# Patient Record
Sex: FEMALE | Race: AMERICAN INDIAN OR ALASKA NATIVE | HISPANIC OR LATINO
[De-identification: names, ages, dates, MRNs, and addresses within clinical notes are randomized per-mention and may not be internally consistent; named-entity substitution may affect disease eponyms.]

---

## 2017-03-06 ENCOUNTER — APPOINTMENT (OUTPATIENT)
Dept: PULMONOLOGY | Facility: CLINIC | Age: 52
End: 2017-03-06

## 2017-03-06 VITALS
SYSTOLIC BLOOD PRESSURE: 90 MMHG | HEART RATE: 73 BPM | BODY MASS INDEX: 21.71 KG/M2 | WEIGHT: 118 LBS | TEMPERATURE: 98.5 F | DIASTOLIC BLOOD PRESSURE: 60 MMHG | HEIGHT: 62 IN | OXYGEN SATURATION: 98 %

## 2017-08-12 ENCOUNTER — EMERGENCY (EMERGENCY)
Facility: HOSPITAL | Age: 52
LOS: 1 days | Discharge: HOME | End: 2017-08-12
Admitting: EMERGENCY MEDICINE
Payer: COMMERCIAL

## 2017-08-12 VITALS
RESPIRATION RATE: 18 BRPM | TEMPERATURE: 99 F | HEIGHT: 61 IN | SYSTOLIC BLOOD PRESSURE: 100 MMHG | WEIGHT: 116.18 LBS | DIASTOLIC BLOOD PRESSURE: 61 MMHG | HEART RATE: 66 BPM | OXYGEN SATURATION: 96 %

## 2017-08-12 VITALS
DIASTOLIC BLOOD PRESSURE: 64 MMHG | OXYGEN SATURATION: 98 % | SYSTOLIC BLOOD PRESSURE: 113 MMHG | HEART RATE: 57 BPM | TEMPERATURE: 99 F | RESPIRATION RATE: 18 BRPM

## 2017-08-12 DIAGNOSIS — Z87.891 PERSONAL HISTORY OF NICOTINE DEPENDENCE: ICD-10-CM

## 2017-08-12 DIAGNOSIS — H92.01 OTALGIA, RIGHT EAR: ICD-10-CM

## 2017-08-12 DIAGNOSIS — H65.191 OTHER ACUTE NONSUPPURATIVE OTITIS MEDIA, RIGHT EAR: ICD-10-CM

## 2017-08-12 PROCEDURE — 99283 EMERGENCY DEPT VISIT LOW MDM: CPT | Mod: 25

## 2017-08-12 PROCEDURE — 99283 EMERGENCY DEPT VISIT LOW MDM: CPT

## 2017-08-12 RX ORDER — MOMETASONE FUROATE 50 UG/1
2 SPRAY NASAL
Qty: 1 | Refills: 0 | OUTPATIENT
Start: 2017-08-12 | End: 2017-08-19

## 2017-08-12 RX ORDER — PSEUDOEPHEDRINE HCL 30 MG
1 TABLET ORAL
Qty: 12 | Refills: 0 | OUTPATIENT
Start: 2017-08-12 | End: 2017-08-16

## 2017-08-12 RX ORDER — IBUPROFEN 200 MG
1 TABLET ORAL
Qty: 9 | Refills: 0 | OUTPATIENT
Start: 2017-08-12 | End: 2017-08-15

## 2017-08-12 NOTE — ED PROVIDER NOTE - MEDICAL DECISION MAKING DETAILS
pt w/ear pain since yest, no AOM or AOE b/l, + fluid behind b/l TMs, will tx w/nasal spray and sudafed, f/u w/ent. pt understands and agrees w/plan

## 2017-08-12 NOTE — ED PROVIDER NOTE - OBJECTIVE STATEMENT
The pt is a 51 y/o F, who presents to ED c/o R ear pain x 1 d. Pt admits to nasal congestion. Pain is 4/10, pressure like, took tyl w/relief, feels dizzy when turning head. Denies h/a, fevers, chills, discharge from ear, n/v

## 2017-08-12 NOTE — ED ADULT NURSE NOTE - CHPI ED SYMPTOMS NEG
no vomiting/no syncope/no numbness/no nausea/no loss of consciousness/no chills/no weakness no weakness/no syncope/no loss of consciousness/no vomiting/no numbness/no chills

## 2017-08-12 NOTE — ED PROVIDER NOTE - ENMT, MLM
Airway patent, + nasal congestion, no frontal or maxillary sinus tend b/l. + post nasal drip  Mouth with normal mucosa. Throat has no vesicles, no oropharyngeal exudates and uvula is midline. Ears: + fluid behind b/l TMs b/l, no AOM b/l, no AOE b/l. no cervical lymphadenopathy b/l

## 2017-08-12 NOTE — ED ADULT NURSE NOTE - OBJECTIVE STATEMENT
Pt presented to ED with right ear pain, nausea, white color productive cough and dizziness. As per pt, onset of symptom was yesterday. Pt has taken Tylenol 1AM, with moderate relief. Pt denies vomiting, any ear discharge, vision change, SOB, chest pain. Respiration unlabored and even, lungs sound clear bilaterally, skin warm ands non-diaphoretic, pt is able to speak coherently in full sentences, NAD noted.

## 2018-02-17 ENCOUNTER — EMERGENCY (EMERGENCY)
Facility: HOSPITAL | Age: 53
LOS: 1 days | Discharge: HOME | End: 2018-02-17
Admitting: EMERGENCY MEDICINE
Payer: COMMERCIAL

## 2018-02-17 VITALS
RESPIRATION RATE: 16 BRPM | SYSTOLIC BLOOD PRESSURE: 107 MMHG | OXYGEN SATURATION: 98 % | TEMPERATURE: 98 F | WEIGHT: 117.95 LBS | DIASTOLIC BLOOD PRESSURE: 78 MMHG | HEART RATE: 68 BPM | HEIGHT: 62 IN

## 2018-02-17 DIAGNOSIS — Z79.899 OTHER LONG TERM (CURRENT) DRUG THERAPY: ICD-10-CM

## 2018-02-17 DIAGNOSIS — R68.84 JAW PAIN: ICD-10-CM

## 2018-02-17 DIAGNOSIS — Z79.52 LONG TERM (CURRENT) USE OF SYSTEMIC STEROIDS: ICD-10-CM

## 2018-02-17 DIAGNOSIS — M25.521 PAIN IN RIGHT ELBOW: ICD-10-CM

## 2018-02-17 DIAGNOSIS — Z79.1 LONG TERM (CURRENT) USE OF NON-STEROIDAL ANTI-INFLAMMATORIES (NSAID): ICD-10-CM

## 2018-02-17 PROCEDURE — 99283 EMERGENCY DEPT VISIT LOW MDM: CPT

## 2018-02-17 PROCEDURE — 99284 EMERGENCY DEPT VISIT MOD MDM: CPT | Mod: 25

## 2018-02-17 NOTE — ED PROVIDER NOTE - ENMT, MLM
Airway patent, Nasal mucosa clear. Mouth with normal mucosa. + discomfort over R TMJ, no trismus. Throat has no vesicles, no oropharyngeal exudates and uvula is midline. Ears: pearly gray TMs b/l. no cervical lymphadenopathy b/l

## 2018-02-17 NOTE — ED ADULT TRIAGE NOTE - CHIEF COMPLAINT QUOTE
pt c/o right sided jaw pain x 2 months worsening over the last week. pt on Augmentin with no relief.

## 2018-02-17 NOTE — ED PROVIDER NOTE - OBJECTIVE STATEMENT
The pt is a 53 y/o F, who presents to ED c/o R jaw pain x 2 mon. Pt states that has seen her dentist and dx'd w/TMJ, was advised to take ibuprofen, but has only taken it a few times - does admit to pain relief when takes it, no acute changes in symptoms today - pt wants to know why pain remains, reports that got better last wk but then she ate something crunchy and pain returned, 6/10, throbbing, aggravated w/opening mouth wide and chewing. Denies trauma, fevers, chills, pus, bleeding, swelling, h/a

## 2018-02-17 NOTE — ED ADULT NURSE NOTE - CHPI ED SYMPTOMS NEG
no fever/no numbness/no syncope/no loss of consciousness/no nausea/no blurred vision/no vomiting/no weakness/no change in level of consciousness/no chills

## 2018-08-17 ENCOUNTER — APPOINTMENT (OUTPATIENT)
Dept: PULMONOLOGY | Facility: CLINIC | Age: 53
End: 2018-08-17
Payer: MEDICAID

## 2018-08-17 PROCEDURE — 71046 X-RAY EXAM CHEST 2 VIEWS: CPT

## 2018-11-10 VITALS
HEART RATE: 100 BPM | RESPIRATION RATE: 18 BRPM | TEMPERATURE: 98 F | OXYGEN SATURATION: 100 % | SYSTOLIC BLOOD PRESSURE: 126 MMHG | DIASTOLIC BLOOD PRESSURE: 83 MMHG

## 2018-11-10 LAB
ALBUMIN SERPL ELPH-MCNC: 4.9 G/DL — SIGNIFICANT CHANGE UP (ref 3.3–5)
ALP SERPL-CCNC: 95 U/L — SIGNIFICANT CHANGE UP (ref 40–120)
ALT FLD-CCNC: 19 U/L — SIGNIFICANT CHANGE UP (ref 10–45)
ANION GAP SERPL CALC-SCNC: 21 MMOL/L — HIGH (ref 5–17)
APTT BLD: 37 SEC — HIGH (ref 27.5–36.3)
AST SERPL-CCNC: 31 U/L — SIGNIFICANT CHANGE UP (ref 10–40)
BASOPHILS NFR BLD AUTO: 0.4 % — SIGNIFICANT CHANGE UP (ref 0–2)
BILIRUB SERPL-MCNC: 1 MG/DL — SIGNIFICANT CHANGE UP (ref 0.2–1.2)
BUN SERPL-MCNC: 7 MG/DL — SIGNIFICANT CHANGE UP (ref 7–23)
CALCIUM SERPL-MCNC: 10 MG/DL — SIGNIFICANT CHANGE UP (ref 8.4–10.5)
CHLORIDE SERPL-SCNC: 99 MMOL/L — SIGNIFICANT CHANGE UP (ref 96–108)
CO2 SERPL-SCNC: 20 MMOL/L — LOW (ref 22–31)
CREAT SERPL-MCNC: 0.53 MG/DL — SIGNIFICANT CHANGE UP (ref 0.5–1.3)
EOSINOPHIL NFR BLD AUTO: 1.2 % — SIGNIFICANT CHANGE UP (ref 0–6)
GLUCOSE SERPL-MCNC: 114 MG/DL — HIGH (ref 70–99)
HCT VFR BLD CALC: 36.3 % — SIGNIFICANT CHANGE UP (ref 34.5–45)
HGB BLD-MCNC: 12.3 G/DL — SIGNIFICANT CHANGE UP (ref 11.5–15.5)
INR BLD: 1.01 — SIGNIFICANT CHANGE UP (ref 0.88–1.16)
LYMPHOCYTES # BLD AUTO: 31.8 % — SIGNIFICANT CHANGE UP (ref 13–44)
MCHC RBC-ENTMCNC: 23.2 PG — LOW (ref 27–34)
MCHC RBC-ENTMCNC: 33.9 G/DL — SIGNIFICANT CHANGE UP (ref 32–36)
MCV RBC AUTO: 68.4 FL — LOW (ref 80–100)
MONOCYTES NFR BLD AUTO: 4.9 % — SIGNIFICANT CHANGE UP (ref 2–14)
NEUTROPHILS NFR BLD AUTO: 61.7 % — SIGNIFICANT CHANGE UP (ref 43–77)
NT-PROBNP SERPL-SCNC: 42 PG/ML — SIGNIFICANT CHANGE UP (ref 0–300)
PLATELET # BLD AUTO: 335 K/UL — SIGNIFICANT CHANGE UP (ref 150–400)
POTASSIUM SERPL-MCNC: 3.9 MMOL/L — SIGNIFICANT CHANGE UP (ref 3.5–5.3)
POTASSIUM SERPL-SCNC: 3.9 MMOL/L — SIGNIFICANT CHANGE UP (ref 3.5–5.3)
PROT SERPL-MCNC: 8.5 G/DL — HIGH (ref 6–8.3)
PROTHROM AB SERPL-ACNC: 11.4 SEC — SIGNIFICANT CHANGE UP (ref 10–12.9)
RBC # BLD: 5.31 M/UL — HIGH (ref 3.8–5.2)
RBC # FLD: 15.2 % — SIGNIFICANT CHANGE UP (ref 10.3–16.9)
SODIUM SERPL-SCNC: 140 MMOL/L — SIGNIFICANT CHANGE UP (ref 135–145)
TROPONIN T SERPL-MCNC: <0.01 NG/ML — SIGNIFICANT CHANGE UP (ref 0–0.01)
WBC # BLD: 5.1 K/UL — SIGNIFICANT CHANGE UP (ref 3.8–10.5)
WBC # FLD AUTO: 5.1 K/UL — SIGNIFICANT CHANGE UP (ref 3.8–10.5)

## 2018-11-10 PROCEDURE — 71045 X-RAY EXAM CHEST 1 VIEW: CPT | Mod: 26

## 2018-11-10 RX ORDER — SODIUM CHLORIDE 9 MG/ML
1000 INJECTION INTRAMUSCULAR; INTRAVENOUS; SUBCUTANEOUS ONCE
Qty: 0 | Refills: 0 | Status: COMPLETED | OUTPATIENT
Start: 2018-11-10 | End: 2018-11-10

## 2018-11-10 RX ADMIN — SODIUM CHLORIDE 1000 MILLILITER(S): 9 INJECTION INTRAMUSCULAR; INTRAVENOUS; SUBCUTANEOUS at 23:19

## 2018-11-10 NOTE — ED ADULT NURSE NOTE - NSIMPLEMENTINTERV_GEN_ALL_ED
Implemented All Universal Safety Interventions:  [place] to call system. Call bell, personal items and telephone within reach. Instruct patient to call for assistance. Room bathroom lighting operational. Non-slip footwear when patient is off stretcher. Physically safe environment: no spills, clutter or unnecessary equipment. Stretcher in lowest position, wheels locked, appropriate side rails in place.

## 2018-11-10 NOTE — ED PROVIDER NOTE - OBJECTIVE STATEMENT
53 year old female presents to ED with concern for difficulty swallowing after eating a piece of bread at dinner this evening.  Patient states she eventually was able to get the food down, however states she had some residual chest wall discomfort following this incident.  She has been experiencing similar symptoms over the past week and was seen by her PCP who prescribed her "some type of medicine to take before meals, I'm not sure what the name is" - which she has been taking as prescribed.  She was at an event this evening after dinner when she became uneasy feeling and began experiencing extremity weakness and lightheadedness.  On ED arrival, patient notes her symptoms are improving.  She denies associated chest discomfort currently, shortness of breath, drooling, throat pain, fever, chills, abdominal pain, nausea, vomiting or any additional acute complaints or concerns at this time.

## 2018-11-10 NOTE — ED PROVIDER NOTE - ENMT NEGATIVE STATEMENT, MLM
Ears: no ear pain and no hearing problems.Nose: no nasal congestion and no nasal drainage.Mouth/Throat: no dysphagia, no hoarseness + difficulty swallowing. Neck: no lumps, no pain, no stiffness and no swollen glands.

## 2018-11-10 NOTE — ED PROVIDER NOTE - NEUROLOGICAL, MLM
Alert and oriented, no focal deficits, no motor or sensory deficits.  5/5 strength x 4 extremities against gravity and external force.  No slurred speech.  No cerebellar signs.

## 2018-11-10 NOTE — ED ADULT NURSE NOTE - OBJECTIVE STATEMENT
Pt states " I feel weak in my arms and legs, my throat feels dry and I feel like my HR is slow. My doctor said I have silent reflux so I have been eating just vegetables at home so as to decrease the irritation. He also gave me medicine for it which I have been taking." Denies CP, SOB

## 2018-11-10 NOTE — ED ADULT TRIAGE NOTE - ARRIVAL INFO ADDITIONAL COMMENTS
pt c/o difficulty swallowing with throat pain for last 90 mins with sob, weakness in arms and legs, chest pain.  similar episode 1 week ago that lasted 20 mins.

## 2018-11-11 ENCOUNTER — INPATIENT (INPATIENT)
Facility: HOSPITAL | Age: 53
LOS: 1 days | Discharge: HOME | DRG: 392 | End: 2018-11-13
Attending: STUDENT IN AN ORGANIZED HEALTH CARE EDUCATION/TRAINING PROGRAM | Admitting: STUDENT IN AN ORGANIZED HEALTH CARE EDUCATION/TRAINING PROGRAM
Payer: COMMERCIAL

## 2018-11-11 DIAGNOSIS — Z29.9 ENCOUNTER FOR PROPHYLACTIC MEASURES, UNSPECIFIED: ICD-10-CM

## 2018-11-11 DIAGNOSIS — Z91.89 OTHER SPECIFIED PERSONAL RISK FACTORS, NOT ELSEWHERE CLASSIFIED: ICD-10-CM

## 2018-11-11 DIAGNOSIS — R13.10 DYSPHAGIA, UNSPECIFIED: ICD-10-CM

## 2018-11-11 DIAGNOSIS — E87.2 ACIDOSIS: ICD-10-CM

## 2018-11-11 DIAGNOSIS — K21.0 GASTRO-ESOPHAGEAL REFLUX DISEASE WITH ESOPHAGITIS: ICD-10-CM

## 2018-11-11 LAB
ANION GAP SERPL CALC-SCNC: 14 MMOL/L — SIGNIFICANT CHANGE UP (ref 5–17)
APPEARANCE UR: CLEAR — SIGNIFICANT CHANGE UP
BILIRUB UR-MCNC: NEGATIVE — SIGNIFICANT CHANGE UP
BUN SERPL-MCNC: 7 MG/DL — SIGNIFICANT CHANGE UP (ref 7–23)
CALCIUM SERPL-MCNC: 9.6 MG/DL — SIGNIFICANT CHANGE UP (ref 8.4–10.5)
CHLORIDE SERPL-SCNC: 108 MMOL/L — SIGNIFICANT CHANGE UP (ref 96–108)
CO2 SERPL-SCNC: 23 MMOL/L — SIGNIFICANT CHANGE UP (ref 22–31)
COLOR SPEC: YELLOW — SIGNIFICANT CHANGE UP
CREAT SERPL-MCNC: 0.56 MG/DL — SIGNIFICANT CHANGE UP (ref 0.5–1.3)
DIFF PNL FLD: ABNORMAL
GLUCOSE SERPL-MCNC: 125 MG/DL — HIGH (ref 70–99)
GLUCOSE UR QL: 100
KETONES UR-MCNC: NEGATIVE — SIGNIFICANT CHANGE UP
LEUKOCYTE ESTERASE UR-ACNC: ABNORMAL
MAGNESIUM SERPL-MCNC: 2.2 MG/DL — SIGNIFICANT CHANGE UP (ref 1.6–2.6)
NITRITE UR-MCNC: NEGATIVE — SIGNIFICANT CHANGE UP
PH UR: 6 — SIGNIFICANT CHANGE UP (ref 5–8)
POTASSIUM SERPL-MCNC: 4 MMOL/L — SIGNIFICANT CHANGE UP (ref 3.5–5.3)
POTASSIUM SERPL-SCNC: 4 MMOL/L — SIGNIFICANT CHANGE UP (ref 3.5–5.3)
PROT UR-MCNC: NEGATIVE MG/DL — SIGNIFICANT CHANGE UP
SODIUM SERPL-SCNC: 145 MMOL/L — SIGNIFICANT CHANGE UP (ref 135–145)
SP GR SPEC: <=1.005 — SIGNIFICANT CHANGE UP (ref 1–1.03)
UROBILINOGEN FLD QL: 0.2 E.U./DL — SIGNIFICANT CHANGE UP

## 2018-11-11 PROCEDURE — 99222 1ST HOSP IP/OBS MODERATE 55: CPT | Mod: GC

## 2018-11-11 PROCEDURE — 12345: CPT | Mod: NC,GC

## 2018-11-11 PROCEDURE — 99285 EMERGENCY DEPT VISIT HI MDM: CPT | Mod: 25

## 2018-11-11 RX ORDER — PANTOPRAZOLE SODIUM 20 MG/1
40 TABLET, DELAYED RELEASE ORAL EVERY 24 HOURS
Qty: 0 | Refills: 0 | Status: DISCONTINUED | OUTPATIENT
Start: 2018-11-11 | End: 2018-11-11

## 2018-11-11 RX ORDER — SODIUM CHLORIDE 9 MG/ML
1000 INJECTION INTRAMUSCULAR; INTRAVENOUS; SUBCUTANEOUS
Qty: 0 | Refills: 0 | Status: DISCONTINUED | OUTPATIENT
Start: 2018-11-11 | End: 2018-11-12

## 2018-11-11 RX ORDER — PANTOPRAZOLE SODIUM 20 MG/1
80 TABLET, DELAYED RELEASE ORAL
Qty: 0 | Refills: 0 | Status: DISCONTINUED | OUTPATIENT
Start: 2018-11-11 | End: 2018-11-12

## 2018-11-11 RX ORDER — INFLUENZA VIRUS VACCINE 15; 15; 15; 15 UG/.5ML; UG/.5ML; UG/.5ML; UG/.5ML
0.5 SUSPENSION INTRAMUSCULAR ONCE
Qty: 0 | Refills: 0 | Status: DISCONTINUED | OUTPATIENT
Start: 2018-11-11 | End: 2018-11-13

## 2018-11-11 RX ADMIN — SODIUM CHLORIDE 1000 MILLILITER(S): 9 INJECTION INTRAMUSCULAR; INTRAVENOUS; SUBCUTANEOUS at 02:07

## 2018-11-11 RX ADMIN — PANTOPRAZOLE SODIUM 40 MILLIGRAM(S): 20 TABLET, DELAYED RELEASE ORAL at 05:27

## 2018-11-11 RX ADMIN — PANTOPRAZOLE SODIUM 80 MILLIGRAM(S): 20 TABLET, DELAYED RELEASE ORAL at 17:19

## 2018-11-11 RX ADMIN — SODIUM CHLORIDE 70 MILLILITER(S): 9 INJECTION INTRAMUSCULAR; INTRAVENOUS; SUBCUTANEOUS at 05:26

## 2018-11-11 NOTE — H&P ADULT - NSHPPHYSICALEXAM_GEN_ALL_CORE
.  VITAL SIGNS:  T(C): 36.8 (11-11-18 @ 01:11), Max: 36.9 (11-10-18 @ 22:25)  T(F): 98.2 (11-11-18 @ 01:11), Max: 98.4 (11-10-18 @ 22:25)  HR: 64 (11-11-18 @ 01:11) (64 - 100)  BP: 121/61 (11-11-18 @ 01:11) (121/61 - 126/83)  BP(mean): --  RR: 19 (11-11-18 @ 01:11) (18 - 19)  SpO2: 99% (11-11-18 @ 01:11) (99% - 100%)  Wt(kg): --    PHYSICAL EXAM:    Constitutional: WDWN resting comfortably in bed; NAD  Head: NC/AT  Eyes: PERRL, EOMI, clear conjunctiva  ENT: no nasal discharge; uvula midline, no oropharyngeal erythema or exudates; MMM  Neck: supple; no JVD or thyromegaly  Respiratory: CTA B/L; no W/R/R, no retractions  Cardiac: +S1/S2; RRR; no M/R/G; PMI non-displaced  Gastrointestinal: soft, NT/ND; no rebound or guarding; +BSx4  Genitourinary: normal external genitalia  Back: spine midline, no bony tenderness or step-offs; no CVAT B/L  Extremities: WWP, no clubbing or cyanosis; no peripheral edema  Musculoskeletal: NROM x4; no joint swelling, tenderness or erythema  Vascular: 2+ radial, femoral, DP/PT pulses B/L  Dermatologic: skin warm, dry and intact; no rashes, wounds, or scars  Lymphatic: no submandibular or cervical LAD  Neurologic: AAOx3; CNII-XII grossly intact; no focal deficits  Psychiatric: affect and characteristics of appearance, verbalizations, behaviors are appropriate VITAL SIGNS:  T(C): 36.8 (11-11-18 @ 01:11), Max: 36.9 (11-10-18 @ 22:25)  T(F): 98.2 (11-11-18 @ 01:11), Max: 98.4 (11-10-18 @ 22:25)  HR: 64 (11-11-18 @ 01:11) (64 - 100)  BP: 121/61 (11-11-18 @ 01:11) (121/61 - 126/83)  BP(mean): --  RR: 19 (11-11-18 @ 01:11) (18 - 19)  SpO2: 99% (11-11-18 @ 01:11) (99% - 100%)  Wt(kg): --    PHYSICAL EXAM:    Constitutional: WDWN resting comfortably in bed; NAD  Head: NC/AT  Eyes: PERRL, EOMI, clear conjunctiva  ENT: no nasal discharge; uvula midline, no oropharyngeal erythema or exudates; MMM  Neck: supple; no JVD or thyromegaly  Respiratory: CTA B/L; no W/R/R, no retractions  Cardiac: +S1/S2; RRR; no M/R/G  Gastrointestinal: soft, +epigastric tenderness; no rebound or guarding; +BSx4  Back: spine midline, no bony tenderness or step-offs; no CVAT B/L  Extremities: WWP, no clubbing or cyanosis; no peripheral edema  Musculoskeletal: NROM x4; no joint swelling, tenderness or erythema  Vascular: 2+ radial, DP/PT pulses B/L  Dermatologic: skin warm, dry and intact; no rashes  Lymphatic: no submandibular or cervical LAD  Neurologic: AAOx3; CNII-XII grossly intact; no focal deficits

## 2018-11-11 NOTE — H&P ADULT - PROBLEM SELECTOR PLAN 1
Presented d/t sensation of food getting stuck a few seconds after swallowing, likely esophageal dysphagia.  Dysphagia to solids only, so more likley obstruction, zackary a motility issue. H/o GERD so ddx stricture, less likely malignancy.  No loss of appetite, weight loss, nausea, vomiting, regurgitation.  No history of DM, autoimmune dx or neuromuscular disorder.  No use of meds that cause esophagitis (iron, nsaids, bisphosphonate.)  Per ED provider, GI was consulted and rec barium esophagram in AM possible EGD Monday.  - Barium esophagram Presented d/t sensation of food getting stuck a few seconds after swallowing, likely esophageal dysphagia.  Dysphagia to solids progressing to liquids ddx obstruction (ie stricture, less likely malignancy) vs a motility issue like achalasia. No loss of appetite, weight loss, nausea, vomiting.  No history of DM, autoimmune dx or neuromuscular disorder.  No use of meds that cause esophagitis (iron, nsaids, bisphosphonate.)  Per ED provider, GI was consulted and rec barium esophagram in AM possible EGD Monday.  - Barium esophagram  - Follow up GI recs

## 2018-11-11 NOTE — H&P ADULT - HISTORY OF PRESENT ILLNESS
In ED: T Max: 98.4, HR (64 - 100), BP: (121/61 - 126/83), RR: (18 - 19), SpO2: (99% - 100%).  Labs unremarkable except for microcytosis and anion gap acidosis. 54 yo female with no PMH BIBA [facility] for acute dysphagia to solids and liquids.  Per patient symptoms started 1 week ago and have been progressive.  Dysphagia initially to solids, but now liquids as well.  She saw an ENT on 11/7 who scoped her and said that she had significant esophageal and vocal cord inflammation, and prescribed her omeprazole and a bland liquid diet.  She has been taking meds and eating bland soups but symptoms have worsened.  She feels that they are often brought on by salty foods.  Today she was at a concert and took a bite of food which she felt got stuck in her esophagus causing throat and chest pain, as well as pooling of her secretions.  She felt terrified and became SOB, with tingling in her hands and the sensation that she could not support her body weight.  The episode lasted over an hour so brittney called 911.  Since admission she has been tolerating liquids, though feels that her saliva still has difficulty passing.    In ED: T Max: 98.4, HR (64 - 100), BP: (121/61 - 126/83), RR: (18 - 19), SpO2: (99% - 100%).  Labs unremarkable except for microcytosis and anion gap acidosis.  EKG NSR. 54 yo female with no PMH BIBA [facility] for acute dysphagia to solids and liquids.  Per patient symptoms started 1 week ago and have been progressive.  Dysphagia initially to solids, but now liquids as well.  She saw an ENT on 11/7 who scoped her and said that she had significant esophageal and vocal cord inflammation, and prescribed her omeprazole and a bland liquid diet.  She has been taking meds and eating bland soups but symptoms have worsened.  She feels that they are often brought on by salty foods.  Today she was at a concert and took a bite of food which she felt got stuck in her esophagus causing throat and chest pain, as well as pooling of her secretions.  She felt terrified and became SOB, with tingling in her hands and the sensation that she could not support her body weight.  The episode lasted over an hour so brittney called 911.  Since admission she has been tolerating liquids, though feels that her saliva still has difficulty passing.    In ED: T Max: 98.4, HR (64 - 100), BP: (121/61 - 126/83), RR: (18 - 19), SpO2: (99% - 100%).  Labs unremarkable except for microcytosis and anion gap acidosis.  EKG NSR.    FH: no known history of oropharyngeal carcinomas in family

## 2018-11-11 NOTE — ED ADULT NURSE REASSESSMENT NOTE - NS ED NURSE REASSESS COMMENT FT1
Pt remains in stable condition. No distress noted, breathing with ease on room air. Safety precautions in place. Monitoring continues.

## 2018-11-11 NOTE — H&P ADULT - ASSESSMENT
53 year-old female with GERD presented to ED with difficulty swallowing solids admitted for dysphagia.

## 2018-11-11 NOTE — H&P ADULT - PROBLEM SELECTOR PLAN 4
F: No IVF  E: Replete K>4 Mag>2  N: NPO pending barium esophagram    VTE: Improve score 0, no pharm ppx    Code Status: FULL CODE    Dispo: Admit to F F: NS 70 cc/hr while NPO  E: Replete K>4 Mag>2  N: NPO pending barium esophagram    VTE: Improve score 0, no pharm ppx    Code Status: FULL CODE    Dispo: Admit to F

## 2018-11-11 NOTE — H&P ADULT - NSHPLABSRESULTS_GEN_ALL_CORE
LABS:                         12.3   5.1   )-----------( 335      ( 10 Nov 2018 23:02 )             36.3     11-10    140  |  99  |  7   ----------------------------<  114<H>  3.9   |  20<L>  |  0.53    Ca    10.0      10 Nov 2018 23:02    TPro  8.5<H>  /  Alb  4.9  /  TBili  1.0  /  DBili  x   /  AST  31  /  ALT  19  /  AlkPhos  95  11-10    PT/INR - ( 10 Nov 2018 23:02 )   PT: 11.4 sec;   INR: 1.01          PTT - ( 10 Nov 2018 23:02 )  PTT:37.0 sec    CARDIAC MARKERS ( 10 Nov 2018 23:02 )  x     / <0.01 ng/mL / x     / x     / x        Serum Pro-Brain Natriuretic Peptide: 42 pg/mL (11-10 @ 23:02)    RADIOLOGY, EKG & ADDITIONAL TESTS: Reviewed.

## 2018-11-11 NOTE — H&P ADULT - PROBLEM SELECTOR PLAN 3
Likely ketosis in setting of dysphagia.  Pt is well perfused and has no reason to have lactic acidosis.  - UA  - Repeat BMP Likely ketosis in setting of dysphagia.  Pt is well-perfused and has no reason to have lactic acidosis.  - UA  - Repeat BMP

## 2018-11-11 NOTE — CONSULT NOTE ADULT - SUBJECTIVE AND OBJECTIVE BOX
HPI:    Of note patient is a poor historian and has to be asked questions many times and often changes her answer.     52 yo female with no PMH BIBA [facility] for dysphagia. Per patient it has been going on for 6 month- 1 year. The dysphagia is to both solids and liquids. Sometimes it is hard for her to swallow and she feels like it gets stuck. Other times, she just has a sour taste in her mouth. She thinks it is worse with salty foods and animal products. She reports that sometimes she feels like her tongue gets enlarged but is unsure if she has allergy. She is sometimes nauseous but never vomits. She denies abdominal pain and reports normal BMs. No weight loss. She had a cscope approximately a year ago and reports that it was normal. She saw a ENT doctor recently who put her on PPI BID.     She came to the hospital because last night she had the worst pain after taking a sip of green tea. She started to have a panic attack per the daughter and had tingling throughout her whole body.       Allergies    No Known Allergies    Intolerances      Home Medications:  omeprazole 20 mg oral delayed release capsule: 1 cap(s) orally once a day (11 Nov 2018 03:29)    MEDICATIONS:  MEDICATIONS  (STANDING):  influenza   Vaccine 0.5 milliLiter(s) IntraMuscular once  pantoprazole  Injectable 40 milliGRAM(s) IV Push every 24 hours  sodium chloride 0.9%. 1000 milliLiter(s) (70 mL/Hr) IV Continuous <Continuous>    MEDICATIONS  (PRN):    PAST MEDICAL & SURGICAL HISTORY:  Reflux esophagitis  No significant past surgical history    FAMILY HISTORY:  mother- stomach cancer    SOCIAL HISTORY:  [place]: denies  Alcohol: denies  Illicit Drugs: denies    REVIEW OF SYSTEMS:  CONSTITUTIONAL: No weakness, fevers or chills  HEENT: No visual changes; No vertigo or throat pain   NECK: No pain or stiffness  RESPIRATORY: No cough, wheezing, hemoptysis; No shortness of breath  CARDIOVASCULAR: No chest pain or palpitations  GASTROINTESTINAL: + dysphagia  GENITOURINARY: No dysuria, frequency or hematuria  NEUROLOGICAL: No numbness or weakness  SKIN: No itching, burning, rashes, or lesions   All other 10 review of systems is negative unless indicated above.    Vital Signs Last 24 Hrs  T(C): 36.7 (11 Nov 2018 08:28), Max: 37.1 (11 Nov 2018 02:52)  T(F): 98 (11 Nov 2018 08:28), Max: 98.7 (11 Nov 2018 02:52)  HR: 67 (11 Nov 2018 08:28) (63 - 100)  BP: 105/58 (11 Nov 2018 08:28) (105/58 - 132/79)  BP(mean): --  RR: 18 (11 Nov 2018 08:28) (18 - 19)  SpO2: 98% (11 Nov 2018 08:28) (98% - 100%)      PHYSICAL EXAM:    General: Well developed; well nourished; in no acute distress  Eyes: Anicteric sclerae, moist conjunctivae  HENT: Moist mucous membranes  Neck: Trachea midline, supple  Lungs: Normal respiratory effors and no intercostal retractions  Cardiovascular: RRR  Abdomen: Soft, non-tender non-distended; Normal bowel sounds; No rebound or guarding  Extremities: Normal range of motion, No clubbing, cyanosis or edema  Neurological: Alert and oriented x3  Skin: Warm and dry. No obvious rash    LABS:                        12.3   5.1   )-----------( 335      ( 10 Nov 2018 23:02 )             36.3     11-11    145  |  108  |  7   ----------------------------<  125<H>  4.0   |  23  |  0.56    Ca    9.6      11 Nov 2018 07:10  Mg     2.2     11-11    TPro  8.5<H>  /  Alb  4.9  /  TBili  1.0  /  DBili  x   /  AST  31  /  ALT  19  /  AlkPhos  95  11-10        PT/INR - ( 10 Nov 2018 23:02 )   PT: 11.4 sec;   INR: 1.01          PTT - ( 10 Nov 2018 23:02 )  PTT:37.0 sec    RADIOLOGY & ADDITIONAL STUDIES:

## 2018-11-11 NOTE — CONSULT NOTE ADULT - ATTENDING COMMENTS
Pt seen and examined, case d/w fellow.  Pt with dysphagia symptoms possibly related to reflux.  Would plan for an esophagram tomorrow.  Continue IV PPI bid.  Could give a low sodium full liquid diet for now.  Possible EGD later this week although pt reports a recent endoscopy with Dr. Alexander.

## 2018-11-11 NOTE — CONSULT NOTE ADULT - ASSESSMENT
54 yo female with no PMH BIBA [facility] for dysphagia    1. Dysphagia: to solids and liquids  Ddx: GERD vs structural etiology ( Peptic stricture, mass) vs motility etiology ( achalasia, spasm); there may also be a psychiatric component ( patient is very anxious and has odd affect)  -No red flag symptoms  -Recommend IV PPI BID  -Recommend esophagram  -Can have full liquids    Case discussed with Dr. Glass 54 yo female with no PMH BIBA [facility] for dysphagia    1. Dysphagia: to solids and liquids  Ddx: GERD vs EOE vs structural etiology ( Peptic stricture, mass) vs motility etiology ( achalasia, spasm); there may also be a psychiatric component ( patient is very anxious and has odd affect)  -No red flag symptoms  -Recommend IV PPI BID  -Recommend esophagram  -Can have full liquids    Case discussed with Dr. Glass

## 2018-11-11 NOTE — H&P ADULT - PROBLEM SELECTOR PLAN 2
On omeprazole at home.  - Continue pantoprazole 40 mg PO daily On omeprazole at home.  - Continue pantoprazole 40 mg IV daily

## 2018-11-12 LAB
ANION GAP SERPL CALC-SCNC: 14 MMOL/L — SIGNIFICANT CHANGE UP (ref 5–17)
BUN SERPL-MCNC: 6 MG/DL — LOW (ref 7–23)
CALCIUM SERPL-MCNC: 9.2 MG/DL — SIGNIFICANT CHANGE UP (ref 8.4–10.5)
CHLORIDE SERPL-SCNC: 103 MMOL/L — SIGNIFICANT CHANGE UP (ref 96–108)
CO2 SERPL-SCNC: 23 MMOL/L — SIGNIFICANT CHANGE UP (ref 22–31)
CREAT SERPL-MCNC: 0.59 MG/DL — SIGNIFICANT CHANGE UP (ref 0.5–1.3)
EXTRA LAVENDER TOP TUBE: SIGNIFICANT CHANGE UP
GLUCOSE SERPL-MCNC: 90 MG/DL — SIGNIFICANT CHANGE UP (ref 70–99)
POTASSIUM SERPL-MCNC: 3.5 MMOL/L — SIGNIFICANT CHANGE UP (ref 3.5–5.3)
POTASSIUM SERPL-SCNC: 3.5 MMOL/L — SIGNIFICANT CHANGE UP (ref 3.5–5.3)
SODIUM SERPL-SCNC: 140 MMOL/L — SIGNIFICANT CHANGE UP (ref 135–145)

## 2018-11-12 PROCEDURE — 74220 X-RAY XM ESOPHAGUS 1CNTRST: CPT | Mod: 26

## 2018-11-12 PROCEDURE — 99232 SBSQ HOSP IP/OBS MODERATE 35: CPT

## 2018-11-12 PROCEDURE — 99233 SBSQ HOSP IP/OBS HIGH 50: CPT | Mod: GC

## 2018-11-12 RX ORDER — POTASSIUM CHLORIDE 20 MEQ
10 PACKET (EA) ORAL
Qty: 0 | Refills: 0 | Status: COMPLETED | OUTPATIENT
Start: 2018-11-12 | End: 2018-11-12

## 2018-11-12 RX ORDER — PANTOPRAZOLE SODIUM 20 MG/1
40 TABLET, DELAYED RELEASE ORAL EVERY 12 HOURS
Qty: 0 | Refills: 0 | Status: DISCONTINUED | OUTPATIENT
Start: 2018-11-12 | End: 2018-11-13

## 2018-11-12 RX ORDER — LANOLIN ALCOHOL/MO/W.PET/CERES
5 CREAM (GRAM) TOPICAL AT BEDTIME
Qty: 0 | Refills: 0 | Status: DISCONTINUED | OUTPATIENT
Start: 2018-11-12 | End: 2018-11-13

## 2018-11-12 RX ADMIN — PANTOPRAZOLE SODIUM 40 MILLIGRAM(S): 20 TABLET, DELAYED RELEASE ORAL at 19:30

## 2018-11-12 RX ADMIN — Medication 100 MILLIEQUIVALENT(S): at 11:56

## 2018-11-12 RX ADMIN — Medication 100 MILLIEQUIVALENT(S): at 15:55

## 2018-11-12 RX ADMIN — PANTOPRAZOLE SODIUM 80 MILLIGRAM(S): 20 TABLET, DELAYED RELEASE ORAL at 05:57

## 2018-11-12 RX ADMIN — Medication 100 MILLIEQUIVALENT(S): at 09:25

## 2018-11-12 NOTE — PROGRESS NOTE ADULT - SUBJECTIVE AND OBJECTIVE BOX
OVERNIGHT EVENTS: NAEO.     SUBJECTIVE / INTERVAL HPI: Patient seen and examined at bedside. Pt with continued dysphagia to saliva which has improved since admission; Pt denies any abdominal pain, nausea, or vomit, just mild tenderness to palpation in epigastric region on exam. Plan for patient is Esophagram today, pending endoscopy.     VITAL SIGNS:  Vital Signs Last 24 Hrs  T(C): 36.8 (12 Nov 2018 08:35), Max: 36.8 (11 Nov 2018 21:26)  T(F): 98.2 (12 Nov 2018 08:35), Max: 98.3 (11 Nov 2018 21:26)  HR: 57 (12 Nov 2018 08:35) (57 - 70)  BP: 120/57 (12 Nov 2018 08:35) (105/65 - 123/56)  BP(mean): --  RR: 19 (12 Nov 2018 08:35) (17 - 19)  SpO2: 100% (12 Nov 2018 08:35) (98% - 100%)    PHYSICAL EXAM:    General:  male in NAD   HEENT: NC/AT; PERRL, anicteric sclera; MMM  Neck: supple  Cardiovascular: +S1/S2, RRR  Respiratory: CTA B/L; no W/R/R  Gastrointestinal: soft, ND mild epigastric tenderness; +BSx4  Extremities: WWP; no edema, clubbing or cyanosis  Vascular: 2+ radial, DP/PT pulses B/L  Neurological: AAOx3; no focal deficits    MEDICATIONS:  MEDICATIONS  (STANDING):  influenza   Vaccine 0.5 milliLiter(s) IntraMuscular once  pantoprazole  Injectable 80 milliGRAM(s) IV Push two times a day  potassium chloride  10 mEq/100 mL IVPB 10 milliEquivalent(s) IV Intermittent every 1 hour    MEDICATIONS  (PRN):      ALLERGIES:  Allergies    No Known Allergies    Intolerances        LABS:                        12.3   5.1   )-----------( 335      ( 10 Nov 2018 23:02 )             36.3     11-12    140  |  103  |  6<L>  ----------------------------<  90  3.5   |  23  |  0.59    Ca    9.2      12 Nov 2018 05:49  Mg     2.2     11-11    TPro  8.5<H>  /  Alb  4.9  /  TBili  1.0  /  DBili  x   /  AST  31  /  ALT  19  /  AlkPhos  95  11-10    PT/INR - ( 10 Nov 2018 23:02 )   PT: 11.4 sec;   INR: 1.01          PTT - ( 10 Nov 2018 23:02 )  PTT:37.0 sec  Urinalysis Basic - ( 11 Nov 2018 16:07 )    Color: Yellow / Appearance: Clear / SG: <=1.005 / pH: x  Gluc: x / Ketone: NEGATIVE  / Bili: Negative / Urobili: 0.2 E.U./dL   Blood: x / Protein: NEGATIVE mg/dL / Nitrite: NEGATIVE   Leuk Esterase: Small / RBC: < 5 /HPF / WBC 5-10 /HPF   Sq Epi: x / Non Sq Epi: 0-5 /HPF / Bacteria: None /HPF      CAPILLARY BLOOD GLUCOSE          RADIOLOGY & ADDITIONAL TESTS: Reviewed.

## 2018-11-12 NOTE — PROGRESS NOTE ADULT - ATTENDING COMMENTS
Pt seen and examined by me at bedside earlier in AM. Agree with housestaff's exam/a/p as noted above with additions,   tolerating clear liquid, esophagram reviewed.   VSS, exam as above   labs reviewed.   a/p:  1. Dysphagia-scheduled for EGD tomorrow.   rest of a/p as above.

## 2018-11-12 NOTE — PROGRESS NOTE ADULT - ASSESSMENT
53yoF with PMH of GERD who presented to ED on 11/11 with progressively worsening difficulty swallowing solids which has progressed  to liquids admitted for dysphagia workup, will receive esophagram 11/12 pending official read pending endoscopy.

## 2018-11-12 NOTE — PROGRESS NOTE ADULT - PROBLEM SELECTOR PLAN 1
Pt presented with dysphagia that has progressed from solids now to liquids, with episode feeling food getting impacted esophagus possible differentials include strictures most likely in setting of GERD PMH vs malignancy less likely with no consistitutional sx of weight loss/fatigue vs motility issues. No chronic history of use of meds that cause esophagitis (iron, nsaids, bisphosphonate.)    - Barium esophagram 11/12 pending read   - Pending endoscopy   - Follow up GI recs

## 2018-11-12 NOTE — PROGRESS NOTE ADULT - ASSESSMENT
53yr old female with no PMHx who presents for evaluation of dysphagia    # Dysphagia-  - to both consistencies of sudden onset suggesting more likely a mechanical process  - ddx - stricture (peptic, malignant), unspecified esophageal dysmotility,   - patient does not have any alarm symptoms at this time (n/v, abdominal pain, weight loss, bleeding, anemia)  - she also had a recent EGD/colonoscopy - with Dr Alexander  - esophagram normal  - will plan for EGD to r/o any other possible pathology not picked up by esophagram  - NPO midnite  - PPI BID 40mg      Case discussed with attending Dr Starr  GI following

## 2018-11-12 NOTE — PROGRESS NOTE ADULT - PROBLEM SELECTOR PLAN 4
F: None tolerating po   E: Replete K>4 Mag>2  N: NPO pending barium esophagram    VTE: Improve score 0, no pharm ppx    Code Status: FULL CODE    Dispo: Admit to F

## 2018-11-12 NOTE — PROGRESS NOTE ADULT - SUBJECTIVE AND OBJECTIVE BOX
Pt seen and examined at bedside  No new c/o today  Reports that she is hungry and quiana like to et if possible  still waiting for her esophagram  Denies n/v/d, abdominal pain      MEDICATIONS:  MEDICATIONS  (STANDING):  influenza   Vaccine 0.5 milliLiter(s) IntraMuscular once  pantoprazole  Injectable 40 milliGRAM(s) IV Push every 12 hours    MEDICATIONS  (PRN):      Allergies    No Known Allergies    Intolerances        Vital Signs Last 24 Hrs  T(C): 36.9 (12 Nov 2018 15:26), Max: 36.9 (12 Nov 2018 15:26)  T(F): 98.4 (12 Nov 2018 15:26), Max: 98.4 (12 Nov 2018 15:26)  HR: 61 (12 Nov 2018 15:26) (57 - 70)  BP: 105/61 (12 Nov 2018 15:26) (105/61 - 120/57)  BP(mean): 76 (12 Nov 2018 15:26) (76 - 76)  RR: 20 (12 Nov 2018 15:26) (18 - 20)  SpO2: 100% (12 Nov 2018 15:26) (98% - 100%)      PHYSICAL EXAM:  General: Well developed; well nourished; in no acute distress  HEENT: MMM, conjunctiva and sclera clear  Gastrointestinal: full, soft, BS+, NT, NR, NG, no masses or organs palpated  Skin: Warm and dry. No obvious rash      LABS:  CBC Full  -  ( 10 Nov 2018 23:02 )  WBC Count : 5.1 K/uL  Hemoglobin : 12.3 g/dL  Hematocrit : 36.3 %  Platelet Count - Automated : 335 K/uL  Mean Cell Volume : 68.4 fL  Mean Cell Hemoglobin : 23.2 pg  Mean Cell Hemoglobin Concentration : 33.9 g/dL  Auto Neutrophil % : 61.7 %  Auto Lymphocyte % : 31.8 %  Auto Monocyte % : 4.9 %  Auto Eosinophil % : 1.2 %  Auto Basophil % : 0.4 %    140  |  103  |  6<L>  ----------------------------<  90  3.5   |  23  |  0.59    Ca    9.2      12 Nov 2018 05:49  Mg     2.2     11-11    TPro  8.5<H>  /  Alb  4.9  /  TBili  1.0  /  DBili  x   /  AST  31  /  ALT  19  /  AlkPhos  95  11-10    PT/INR - ( 10 Nov 2018 23:02 )   PT: 11.4 sec;   INR: 1.01        PTT - ( 10 Nov 2018 23:02 )  PTT:37.0 sec    Urinalysis Basic - ( 11 Nov 2018 16:07 )  Color: Yellow / Appearance: Clear / SG: <=1.005 / pH: x  Gluc: x / Ketone: NEGATIVE  / Bili: Negative / Urobili: 0.2 E.U./dL   Blood: x / Protein: NEGATIVE mg/dL / Nitrite: NEGATIVE   Leuk Esterase: Small / RBC: < 5 /HPF / WBC 5-10 /HPF   Sq Epi: x / Non Sq Epi: 0-5 /HPF / Bacteria: None /HPF          RADIOLOGY & ADDITIONAL STUDIES (The following images were personally reviewed):  < from: Xray Esophagram (11.12.18 @ 13:12) >  FINDINGS:  On imaging of the chest demonstrates clear lung fields and normal   cardiomediastinal silhouette. No osseous lesions are seen.    Radiographic and fluoroscopic examination of the esophagus was performed   using barium sulfate suspension and barium tablet. Swallowing and passage   of contrast through the pharynx appeared normal at fluoroscopy. There is   no evidence of esophageal dysmotility. No mass, stricture or other   esophageal abnormality is seen. There is no hiatal hernia. No   gastroesophageal reflux is observed.      IMPRESSION:   Unremarkable study of the esophagus.

## 2018-11-12 NOTE — PROGRESS NOTE ADULT - PROBLEM SELECTOR PLAN 3
-Resolved.  Likely starvation ketosis in setting of dysphagia.  Pt is well-perfused and has no reason to have lactic acidosis. UA negative no sx of infection.

## 2018-11-13 ENCOUNTER — TRANSCRIPTION ENCOUNTER (OUTPATIENT)
Age: 53
End: 2018-11-13

## 2018-11-13 VITALS
HEART RATE: 70 BPM | DIASTOLIC BLOOD PRESSURE: 74 MMHG | OXYGEN SATURATION: 100 % | RESPIRATION RATE: 20 BRPM | TEMPERATURE: 98 F | SYSTOLIC BLOOD PRESSURE: 133 MMHG

## 2018-11-13 DIAGNOSIS — F40.01 AGORAPHOBIA WITH PANIC DISORDER: ICD-10-CM

## 2018-11-13 LAB
ANION GAP SERPL CALC-SCNC: 14 MMOL/L — SIGNIFICANT CHANGE UP (ref 5–17)
APTT BLD: 38.5 SEC — HIGH (ref 27.5–36.3)
BLD GP AB SCN SERPL QL: NEGATIVE — SIGNIFICANT CHANGE UP
BUN SERPL-MCNC: 7 MG/DL — SIGNIFICANT CHANGE UP (ref 7–23)
CALCIUM SERPL-MCNC: 9.8 MG/DL — SIGNIFICANT CHANGE UP (ref 8.4–10.5)
CHLORIDE SERPL-SCNC: 103 MMOL/L — SIGNIFICANT CHANGE UP (ref 96–108)
CO2 SERPL-SCNC: 25 MMOL/L — SIGNIFICANT CHANGE UP (ref 22–31)
CREAT SERPL-MCNC: 0.62 MG/DL — SIGNIFICANT CHANGE UP (ref 0.5–1.3)
GLUCOSE SERPL-MCNC: 84 MG/DL — SIGNIFICANT CHANGE UP (ref 70–99)
HCT VFR BLD CALC: 32.5 % — LOW (ref 34.5–45)
HGB BLD-MCNC: 10.7 G/DL — LOW (ref 11.5–15.5)
INR BLD: 1.11 — SIGNIFICANT CHANGE UP (ref 0.88–1.16)
MAGNESIUM SERPL-MCNC: 2.1 MG/DL — SIGNIFICANT CHANGE UP (ref 1.6–2.6)
MCHC RBC-ENTMCNC: 23.3 PG — LOW (ref 27–34)
MCHC RBC-ENTMCNC: 32.9 G/DL — SIGNIFICANT CHANGE UP (ref 32–36)
MCV RBC AUTO: 70.7 FL — LOW (ref 80–100)
PLATELET # BLD AUTO: 267 K/UL — SIGNIFICANT CHANGE UP (ref 150–400)
POTASSIUM SERPL-MCNC: 3.8 MMOL/L — SIGNIFICANT CHANGE UP (ref 3.5–5.3)
POTASSIUM SERPL-SCNC: 3.8 MMOL/L — SIGNIFICANT CHANGE UP (ref 3.5–5.3)
PROTHROM AB SERPL-ACNC: 12.6 SEC — SIGNIFICANT CHANGE UP (ref 10–12.9)
RBC # BLD: 4.6 M/UL — SIGNIFICANT CHANGE UP (ref 3.8–5.2)
RBC # FLD: 15.4 % — SIGNIFICANT CHANGE UP (ref 10.3–16.9)
RH IG SCN BLD-IMP: POSITIVE — SIGNIFICANT CHANGE UP
SODIUM SERPL-SCNC: 142 MMOL/L — SIGNIFICANT CHANGE UP (ref 135–145)
WBC # BLD: 4.2 K/UL — SIGNIFICANT CHANGE UP (ref 3.8–10.5)
WBC # FLD AUTO: 4.2 K/UL — SIGNIFICANT CHANGE UP (ref 3.8–10.5)

## 2018-11-13 PROCEDURE — 99239 HOSP IP/OBS DSCHRG MGMT >30: CPT

## 2018-11-13 PROCEDURE — 81001 URINALYSIS AUTO W/SCOPE: CPT

## 2018-11-13 PROCEDURE — 85730 THROMBOPLASTIN TIME PARTIAL: CPT

## 2018-11-13 PROCEDURE — 80048 BASIC METABOLIC PNL TOTAL CA: CPT

## 2018-11-13 PROCEDURE — 84484 ASSAY OF TROPONIN QUANT: CPT

## 2018-11-13 PROCEDURE — 85025 COMPLETE CBC W/AUTO DIFF WBC: CPT

## 2018-11-13 PROCEDURE — 85610 PROTHROMBIN TIME: CPT

## 2018-11-13 PROCEDURE — 86901 BLOOD TYPING SEROLOGIC RH(D): CPT

## 2018-11-13 PROCEDURE — 36415 COLL VENOUS BLD VENIPUNCTURE: CPT

## 2018-11-13 PROCEDURE — 86900 BLOOD TYPING SEROLOGIC ABO: CPT

## 2018-11-13 PROCEDURE — 99285 EMERGENCY DEPT VISIT HI MDM: CPT | Mod: 25

## 2018-11-13 PROCEDURE — 83880 ASSAY OF NATRIURETIC PEPTIDE: CPT

## 2018-11-13 PROCEDURE — 80053 COMPREHEN METABOLIC PANEL: CPT

## 2018-11-13 PROCEDURE — 74220 X-RAY XM ESOPHAGUS 1CNTRST: CPT

## 2018-11-13 PROCEDURE — 99223 1ST HOSP IP/OBS HIGH 75: CPT

## 2018-11-13 PROCEDURE — 83735 ASSAY OF MAGNESIUM: CPT

## 2018-11-13 PROCEDURE — 85027 COMPLETE CBC AUTOMATED: CPT

## 2018-11-13 PROCEDURE — 71045 X-RAY EXAM CHEST 1 VIEW: CPT

## 2018-11-13 PROCEDURE — 86850 RBC ANTIBODY SCREEN: CPT

## 2018-11-13 RX ORDER — ESCITALOPRAM OXALATE 10 MG/1
1 TABLET, FILM COATED ORAL
Qty: 7 | Refills: 0
Start: 2018-11-13 | End: 2018-11-19

## 2018-11-13 RX ORDER — ESCITALOPRAM OXALATE 10 MG/1
1 TABLET, FILM COATED ORAL
Qty: 7 | Refills: 0 | OUTPATIENT
Start: 2018-11-13 | End: 2018-11-19

## 2018-11-13 RX ORDER — PANTOPRAZOLE SODIUM 20 MG/1
1 TABLET, DELAYED RELEASE ORAL
Qty: 60 | Refills: 0 | OUTPATIENT
Start: 2018-11-13 | End: 2018-12-12

## 2018-11-13 RX ORDER — ESCITALOPRAM OXALATE 10 MG/1
1 TABLET, FILM COATED ORAL
Qty: 30 | Refills: 1 | OUTPATIENT
Start: 2018-11-13 | End: 2019-01-11

## 2018-11-13 RX ORDER — OMEPRAZOLE 10 MG/1
1 CAPSULE, DELAYED RELEASE ORAL
Qty: 0 | Refills: 0 | COMMUNITY

## 2018-11-13 RX ORDER — PANTOPRAZOLE SODIUM 20 MG/1
1 TABLET, DELAYED RELEASE ORAL
Qty: 30 | Refills: 0 | OUTPATIENT
Start: 2018-11-13 | End: 2018-12-12

## 2018-11-13 RX ORDER — PANTOPRAZOLE SODIUM 20 MG/1
1 TABLET, DELAYED RELEASE ORAL
Qty: 28 | Refills: 0
Start: 2018-11-13 | End: 2018-11-26

## 2018-11-13 RX ORDER — SODIUM CHLORIDE 9 MG/ML
1000 INJECTION INTRAMUSCULAR; INTRAVENOUS; SUBCUTANEOUS
Qty: 0 | Refills: 0 | Status: DISCONTINUED | OUTPATIENT
Start: 2018-11-13 | End: 2018-11-13

## 2018-11-13 RX ORDER — CLONAZEPAM 1 MG
1 TABLET ORAL
Qty: 14 | Refills: 0 | OUTPATIENT
Start: 2018-11-13 | End: 2018-11-19

## 2018-11-13 RX ORDER — OMEPRAZOLE 10 MG/1
1 CAPSULE, DELAYED RELEASE ORAL
Qty: 28 | Refills: 0 | OUTPATIENT
Start: 2018-11-13 | End: 2018-11-26

## 2018-11-13 RX ADMIN — PANTOPRAZOLE SODIUM 40 MILLIGRAM(S): 20 TABLET, DELAYED RELEASE ORAL at 05:42

## 2018-11-13 RX ADMIN — Medication 5 MILLIGRAM(S): at 00:54

## 2018-11-13 NOTE — DISCHARGE NOTE ADULT - MEDICATION SUMMARY - MEDICATIONS TO CHANGE
I will SWITCH the dose or number of times a day I take the medications listed below when I get home from the hospital:    omeprazole 20 mg oral delayed release capsule  -- 1 cap(s) by mouth once a day

## 2018-11-13 NOTE — BEHAVIORAL HEALTH ASSESSMENT NOTE - RECENT MEMORY
77 yo Male w/ HIV c/b HIV related Dementia, HTN, BPH, presenting w/ c/o of fevers and lethargy x 4 days, admitted w/ sepsis 2/2 E. coli UTI. Incidentally discovered to have R sided effusion, w/ loculation. Normal

## 2018-11-13 NOTE — BEHAVIORAL HEALTH ASSESSMENT NOTE - NSBHSUICPROTECTFACT_PSY_A_CORE
Responsibility to family and others/High spirituality/Positive therapeutic relationships/Fear of death or dying due to pain/suffering/Supportive social network or family/Engaged in work or school/Identifies reasons for living/Future oriented

## 2018-11-13 NOTE — BEHAVIORAL HEALTH ASSESSMENT NOTE - HPI (INCLUDE ILLNESS QUALITY, SEVERITY, DURATION, TIMING, CONTEXT, MODIFYING FACTORS, ASSOCIATED SIGNS AND SYMPTOMS)
54 YO Declan beck no past psychiatric history came to hospital wit difficulty swallowing and on Saturday had multiple somatic issues including chest pain, stomach emptiness/knot in stomach, pins & needles feelings in arms. Pt also has had feelings where she was so worried that she might die. In addition, pt has had anxiety that wakes her up from sleep. Pt undergoing esophagogram & endoscopy. Has had a hard time with NPO durations which she feels contribute to weakness and unease. Also has had some agoraphobia like symptoms. Many of these symptoms are absolutely consistent with panic attacks. She already sees a psychologist for weekly therapy. She does not think the therapy is making her feel more anxious. Stressor in her life include increased demands at her job. 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 She is worried and sometimes guilty about not being able to look after her 94 YO father in [facility] more closely. She also reports some family acrimony over will/land/estate. In addition her  recently had open heart surgery. Patient admits there may be a psychological component to many of her symptoms and is willing to try psychiatric medications and see a psychiatrist. Discussed starting an SSRI which would help prevent anxiety/panic symptoms such as lexapro 5 mg daily. This would not start helping her anxiety for 2-4 wks. In the meantime, klonopin 0.5 mg q12h standing (I think a standing medication would be much more beneficial than PRN) could be started to help anxiety symptoms immediately with the goal of tapering the klonopin off once the lexapro had kicked in. She would be amenable to going to psychiatrist at [facility], our ourpatient psychiatric clinic and should continue individual therapy.

## 2018-11-13 NOTE — DISCHARGE NOTE ADULT - CARE PROVIDER_API CALL
Mundo Glass), Gastroenterology  [address] [place] [address], [place] [address]  Phone: [phone]  Fax: [phone] Cydney Alexander), Gastroenterology; Internal Medicine  [address] [place] [address]  Phone: [phone]  Fax: [phone]

## 2018-11-13 NOTE — DISCHARGE NOTE ADULT - CARE PLAN
Principal Discharge DX:	Dysphagia  Goal:	Please continue to increase your diet as you can tolerate and follow up with Gastroenterology as an outpatient.  Assessment and plan of treatment:	You presented with several days history of difficulty swallowing - initially just to solids, then progressed to liquids. You came in to the ED via ambulance as a result of this along with what was likely a panic attack. You were admitted and seen by the Gastroenterology team, who advised a contrast study of the throat and upper digestive tract initially. This study did not reveal anything acute that could be causing your issue with swallowing. You were therefore ordered to undergo an endoscopy for direct visualization of the throat. However, this study was not performed. It was decided, since you were able to tolerate oral intake, that you should follow up with Gastroenterology in clinic and you can undergo the procedure as well.  Secondary Diagnosis:	Reflux esophagitis  Goal:	Please continue to take Pantoprazole 40mg once a day, and follow up with Gastroenterology.  Assessment and plan of treatment:	Esophagitis is inflammation that may damage tissues of the esophagus, the muscular tube that delivers food from your mouth to your stomach.  Esophagitis can cause painful, difficult swallowing and chest pain. Causes of esophagitis include stomach acids backing up into the esophagus, infection, oral medications and allergies.  Treatment for esophagitis depends on the underlying cause and the severity of tissue damage. If left untreated, esophagitis can damage the lining of the esophagus and interfere with its normal function, which is to move food and liquid from your mouth to your stomach. Esophagitis can also lead to complications such as scarring or narrowing of the esophagus, and difficulty swallowing. Medications called proton pump inhibitors, like Pantoprazole, aid in decreasing stomach acid to improve healing. Please take this medication as prescribed. Follow up with Gastroenterology to determine duration of treatment.

## 2018-11-13 NOTE — DISCHARGE NOTE ADULT - PATIENT PORTAL LINK FT
You can access the [employer] Patient Portal, offered by [facility], by registering with the following website: [email or web address]

## 2018-11-13 NOTE — BEHAVIORAL HEALTH ASSESSMENT NOTE - OTHER
lying in hospital bed with her, holding her hand, patient appears anxious and engaging in a lot of activities where others care for her (e.g. dramatically asking for water)

## 2018-11-13 NOTE — DISCHARGE NOTE ADULT - OTHER SIGNIFICANT FINDINGS
< from: Xray Esophagram (11.12.18 @ 13:12) >  FINDINGS:  On imaging of the chest demonstrates clear lung fields and normal   cardiomediastinal silhouette. No osseous lesions are seen.    Radiographic and fluoroscopic examination of the esophagus was performed   using barium sulfate suspension and barium tablet. Swallowing and passage   of contrast through the pharynx appeared normal at fluoroscopy. There is   no evidence of esophageal dysmotility. No mass, stricture or other   esophageal abnormality is seen. There is no hiatal hernia. No   gastroesophageal reflux is observed.      IMPRESSION:   Unremarkable study of the esophagus.    < end of copied text > < from: Xray Esophagram (11.12.18 @ 13:12) >  FINDINGS:  On imaging of the chest demonstrates clear lung fields and normal   cardiomediastinal silhouette. No osseous lesions are seen.    Radiographic and fluoroscopic examination of the esophagus was performed   using barium sulfate suspension and barium tablet. Swallowing and passage   of contrast through the pharynx appeared normal at fluoroscopy. There is   no evidence of esophageal dysmotility. No mass, stricture or other   esophageal abnormality is seen. There is no hiatal hernia. No   gastroesophageal reflux is observed.      IMPRESSION:   Unremarkable study of the esophagus.

## 2018-11-13 NOTE — DISCHARGE NOTE ADULT - ADDITIONAL INSTRUCTIONS
Please attend:   December 3rd at 2pm with Dr Glass.   Contact Details and Address below. Please attend:  November 14th, 11:30AM  with Dr Cydney Alexander

## 2018-11-13 NOTE — PROGRESS NOTE ADULT - SUBJECTIVE AND OBJECTIVE BOX
Pt seen and examined by me at bedside earlier in AM. pt tolerated mushroom soap yesterday.  as per housestaff's, later in the afternoon pt decided to eat hence EGD was cancelled  VSS  comfortable   NAD  +S1/S2 RRR  CTA b/l  +bs/nt/nd  labs reviewed     a/p:  1. Dysphagia-esophagram negative, given that pt is tolerating full liquid diet. GI cleared pt for outpatient w/u  2. Reflux esophagitis-c/w ppi  3. Anemia stable  Dispo: pt can be dc today with outpatient GI f/u.

## 2018-11-13 NOTE — BEHAVIORAL HEALTH ASSESSMENT NOTE - NSBHCONSULTRECOMMENDOTHER_PSY_A_CORE FT
1)Would start lexapro 5 mg daily and klonopin 0.5 mg q12h for panic and anxiety with the object of tapering off klonopin once the lexapro starts working in 2-4 weeks.     2)Refer to [facility], [facility] outpatient psychiatric clinic- [phone], [address]     3)Continue weekly individual therapy.
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
## 2018-11-13 NOTE — DISCHARGE NOTE ADULT - INSTRUCTIONS
Please continue to advance your diet only as tolerated. For now, please continue to eat a predominately liquid diet - this includes soup, broth, juices/smoothies. When you feel that you are able to tolerate more, increase to thicker liquids or soft foods like oatmeal, stew, etc.

## 2018-11-13 NOTE — BEHAVIORAL HEALTH ASSESSMENT NOTE - PATIENT'S CHIEF COMPLAINT
"I have been having trouble swallowing & eating...but It also feels better when someone is right here holding my hand."

## 2018-11-13 NOTE — DISCHARGE NOTE ADULT - HOSPITAL COURSE
54yo Female, PMHx GERD - presented via ambulance with c/o several day history of dysphagia to solids, then liquids. Symptoms also caused significant amount of anxiety leading to several panic attacks, c/o chest pain. Of note, she had already had an outpatient scope that illustrated significant esophagitis, and she had been advised to start a liquid diet. She was seen by GI - an X-ray Esophagram was performed on their advice, and it was wholly unremarkable. She was then scheduled for an EGD, however, on the day of procedure, patient decided that she could wait no longer and had to eat given how weak she was feeling. Following a light meal (jello, etc), she felt better and was able to ambulate unassisted down the andrade. On re-evaluation, GI advised outpatient f/u and scope with advice to continue liquid diet, to advance as tolerated.     She has an appointment with Dr Glass (saw patient in hospital), Dec 3rd 2pm. 54yo Female, PMHx GERD - presented via ambulance with c/o several day history of dysphagia to solids, then liquids. Symptoms also caused significant amount of anxiety leading to several panic attacks, c/o chest pain. Of note, she had already had an outpatient scope that illustrated significant esophagitis, and she had been advised to start a liquid diet. She was seen by GI - an X-ray Esophagram was performed on their advice, and it was wholly unremarkable. She was then scheduled for an EGD, however, on the day of procedure, patient decided that she could wait no longer and had to eat given how weak she was feeling. Following a light meal (jello, etc), she felt better and was able to ambulate unassisted down the andrade. On re-evaluation, GI advised outpatient f/u and scope with advice to continue liquid diet, to advance as tolerated.     She has an appointment with Dr Cydney Alexander - November 14th at 11:30AM

## 2018-11-13 NOTE — DISCHARGE NOTE ADULT - MEDICATION SUMMARY - MEDICATIONS TO TAKE
I will START or STAY ON the medications listed below when I get home from the hospital:    pantoprazole 40 mg oral delayed release tablet  -- 1 tab(s) by mouth once a day   -- It is very important that you take or use this exactly as directed.  Do not skip doses or discontinue unless directed by your doctor.  Obtain medical advice before taking any non-prescription drugs as some may affect the action of this medication.  Swallow whole.  Do not crush.    -- Indication: For Reflux esophagitis I will START or STAY ON the medications listed below when I get home from the hospital:    Lexapro 5 mg oral tablet  -- 1 tab(s) by mouth once a day   -- Check with your doctor before becoming pregnant.  Do not drink alcoholic beverages when taking this medication.  It is very important that you take or use this exactly as directed.  Do not skip doses or discontinue unless directed by your doctor.  May cause drowsiness.  Alcohol may intensify this effect.  Use care when operating dangerous machinery.  Obtain medical advice before taking any non-prescription drugs as some may affect the action of this medication.  This drug may impair the ability to drive or operate machinery.  Use care until you become familiar with its effects.    -- Indication: For Anxiety     pantoprazole 40 mg oral delayed release tablet  -- 1 tab(s) by mouth 2 times a day   -- It is very important that you take or use this exactly as directed.  Do not skip doses or discontinue unless directed by your doctor.  Obtain medical advice before taking any non-prescription drugs as some may affect the action of this medication.  Swallow whole.  Do not crush.    -- Indication: For Reflux esophagitis I will START or STAY ON the medications listed below when I get home from the hospital:    Lexapro 5 mg oral tablet  -- 1 tab(s) by mouth once a day   -- Check with your doctor before becoming pregnant.  Do not drink alcoholic beverages when taking this medication.  It is very important that you take or use this exactly as directed.  Do not skip doses or discontinue unless directed by your doctor.  May cause drowsiness.  Alcohol may intensify this effect.  Use care when operating dangerous machinery.  Obtain medical advice before taking any non-prescription drugs as some may affect the action of this medication.  This drug may impair the ability to drive or operate machinery.  Use care until you become familiar with its effects.    -- Indication: For Anxiety    pantoprazole 40 mg oral delayed release tablet  -- 1 tab(s) by mouth 2 times a day   -- It is very important that you take or use this exactly as directed.  Do not skip doses or discontinue unless directed by your doctor.  Obtain medical advice before taking any non-prescription drugs as some may affect the action of this medication.  Swallow whole.  Do not crush.    -- Indication: For Reflux esophagitis I will START or STAY ON the medications listed below when I get home from the hospital:    clonazePAM 0.5 mg oral tablet  -- 1 tab(s) by mouth 2 times a day MDD:1mg  -- Caution federal law prohibits the transfer of this drug to any person other  than the person for whom it was prescribed.  Do not drink alcoholic beverages when taking this medication.  Do not take this drug if you are pregnant.  It is very important that you take or use this exactly as directed.  Do not skip doses or discontinue unless directed by your doctor.  May cause drowsiness.  Alcohol may intensify this effect.  Use care when operating dangerous machinery.  Obtain medical advice before taking any non-prescription drugs as some may affect the action of this medication.  This drug may impair the ability to drive or operate machinery.  Use care until you become familiar with its effects.    -- Indication: For Anxiety     Lexapro 5 mg oral tablet  -- 1 tab(s) by mouth once a day   -- Check with your doctor before becoming pregnant.  Do not drink alcoholic beverages when taking this medication.  It is very important that you take or use this exactly as directed.  Do not skip doses or discontinue unless directed by your doctor.  May cause drowsiness.  Alcohol may intensify this effect.  Use care when operating dangerous machinery.  Obtain medical advice before taking any non-prescription drugs as some may affect the action of this medication.  This drug may impair the ability to drive or operate machinery.  Use care until you become familiar with its effects.    -- Indication: For Anxiety     pantoprazole 40 mg oral delayed release tablet  -- 1 tab(s) by mouth 2 times a day   -- It is very important that you take or use this exactly as directed.  Do not skip doses or discontinue unless directed by your doctor.  Obtain medical advice before taking any non-prescription drugs as some may affect the action of this medication.  Swallow whole.  Do not crush.    -- Indication: For Reflux esophagitis

## 2018-11-13 NOTE — BEHAVIORAL HEALTH ASSESSMENT NOTE - SUMMARY
52 YO F c no past psych hx other than weekly individual therapy came to hospital with acute dysphagia. Has symptoms consistent with panic disorder.    1)Would start lexapro 5 mg daily and klonopin 0.5 mg q12h for panic and anxiety with the object of tapering off klonopin once the lexapro starts working in 2-4 weeks.     2)Refer to [facility], [facility] outpatient psychiatric clinic- [phone], [address]     3)Continue weekly individual therapy.

## 2018-11-13 NOTE — DISCHARGE NOTE ADULT - PLAN OF CARE
Please continue to increase your diet as you can tolerate and follow up with Gastroenterology as an outpatient. You presented with several days history of difficulty swallowing - initially just to solids, then progressed to liquids. You came in to the ED via ambulance as a result of this along with what was likely a panic attack. You were admitted and seen by the Gastroenterology team, who advised a contrast study of the throat and upper digestive tract initially. This study did not reveal anything acute that could be causing your issue with swallowing. You were therefore ordered to undergo an endoscopy for direct visualization of the throat. However, this study was not performed. It was decided, since you were able to tolerate oral intake, that you should follow up with Gastroenterology in clinic and you can undergo the procedure as well. Please continue to take Pantoprazole 40mg once a day, and follow up with Gastroenterology. Esophagitis is inflammation that may damage tissues of the esophagus, the muscular tube that delivers food from your mouth to your stomach.  Esophagitis can cause painful, difficult swallowing and chest pain. Causes of esophagitis include stomach acids backing up into the esophagus, infection, oral medications and allergies.  Treatment for esophagitis depends on the underlying cause and the severity of tissue damage. If left untreated, esophagitis can damage the lining of the esophagus and interfere with its normal function, which is to move food and liquid from your mouth to your stomach. Esophagitis can also lead to complications such as scarring or narrowing of the esophagus, and difficulty swallowing. Medications called proton pump inhibitors, like Pantoprazole, aid in decreasing stomach acid to improve healing. Please take this medication as prescribed. Follow up with Gastroenterology to determine duration of treatment.

## 2018-11-13 NOTE — BEHAVIORAL HEALTH ASSESSMENT NOTE - NSBHADMITCOUNSEL_PSY_A_CORE
risks and benefits of treatment options/client/family/caregiver education/prognosis/instructions for management, treatment and follow up/diagnostic results/impressions and/or recommended studies

## 2018-11-18 DIAGNOSIS — R13.10 DYSPHAGIA, UNSPECIFIED: ICD-10-CM

## 2018-11-18 DIAGNOSIS — K21.0 GASTRO-ESOPHAGEAL REFLUX DISEASE WITH ESOPHAGITIS: ICD-10-CM

## 2018-11-18 DIAGNOSIS — F40.01 AGORAPHOBIA WITH PANIC DISORDER: ICD-10-CM

## 2018-11-18 DIAGNOSIS — D64.9 ANEMIA, UNSPECIFIED: ICD-10-CM

## 2018-11-18 DIAGNOSIS — E87.2 ACIDOSIS: ICD-10-CM

## 2019-01-17 PROBLEM — K21.0 GASTRO-ESOPHAGEAL REFLUX DISEASE WITH ESOPHAGITIS: Chronic | Status: ACTIVE | Noted: 2018-11-10

## 2019-01-28 ENCOUNTER — APPOINTMENT (OUTPATIENT)
Dept: INTERNAL MEDICINE | Facility: CLINIC | Age: 54
End: 2019-01-28
Payer: MEDICAID

## 2019-01-28 VITALS
WEIGHT: 120 LBS | BODY MASS INDEX: 22.08 KG/M2 | DIASTOLIC BLOOD PRESSURE: 68 MMHG | TEMPERATURE: 97.5 F | HEART RATE: 63 BPM | HEIGHT: 62 IN | SYSTOLIC BLOOD PRESSURE: 105 MMHG | OXYGEN SATURATION: 97 %

## 2019-01-28 DIAGNOSIS — Z12.4 ENCOUNTER FOR SCREENING FOR MALIGNANT NEOPLASM OF CERVIX: ICD-10-CM

## 2019-01-28 DIAGNOSIS — Z12.31 ENCOUNTER FOR SCREENING MAMMOGRAM FOR MALIGNANT NEOPLASM OF BREAST: ICD-10-CM

## 2019-01-28 LAB
CYTOLOGY CVX/VAG DOC THIN PREP: NORMAL
HCV AB SER QL: NORMAL

## 2019-01-28 PROCEDURE — 99386 PREV VISIT NEW AGE 40-64: CPT | Mod: GC

## 2019-02-05 ENCOUNTER — EMERGENCY (EMERGENCY)
Facility: HOSPITAL | Age: 54
LOS: 1 days | Discharge: HOME | End: 2019-02-05
Attending: EMERGENCY MEDICINE | Admitting: EMERGENCY MEDICINE
Payer: COMMERCIAL

## 2019-02-05 VITALS
TEMPERATURE: 98 F | OXYGEN SATURATION: 100 % | DIASTOLIC BLOOD PRESSURE: 56 MMHG | RESPIRATION RATE: 18 BRPM | HEART RATE: 51 BPM | SYSTOLIC BLOOD PRESSURE: 101 MMHG

## 2019-02-05 VITALS
HEART RATE: 60 BPM | SYSTOLIC BLOOD PRESSURE: 107 MMHG | DIASTOLIC BLOOD PRESSURE: 65 MMHG | HEIGHT: 62 IN | OXYGEN SATURATION: 98 % | RESPIRATION RATE: 18 BRPM | TEMPERATURE: 98 F | WEIGHT: 117.95 LBS

## 2019-02-05 PROCEDURE — 93010 ELECTROCARDIOGRAM REPORT: CPT

## 2019-02-05 PROCEDURE — 71046 X-RAY EXAM CHEST 2 VIEWS: CPT | Mod: 26

## 2019-02-05 PROCEDURE — 93005 ELECTROCARDIOGRAM TRACING: CPT

## 2019-02-05 PROCEDURE — 85025 COMPLETE CBC W/AUTO DIFF WBC: CPT

## 2019-02-05 PROCEDURE — 36415 COLL VENOUS BLD VENIPUNCTURE: CPT

## 2019-02-05 PROCEDURE — 82550 ASSAY OF CK (CPK): CPT

## 2019-02-05 PROCEDURE — 99283 EMERGENCY DEPT VISIT LOW MDM: CPT

## 2019-02-05 PROCEDURE — 85610 PROTHROMBIN TIME: CPT

## 2019-02-05 PROCEDURE — 71046 X-RAY EXAM CHEST 2 VIEWS: CPT

## 2019-02-05 PROCEDURE — 80053 COMPREHEN METABOLIC PANEL: CPT

## 2019-02-05 PROCEDURE — 99285 EMERGENCY DEPT VISIT HI MDM: CPT | Mod: 25

## 2019-02-05 PROCEDURE — 84484 ASSAY OF TROPONIN QUANT: CPT

## 2019-02-05 PROCEDURE — 85730 THROMBOPLASTIN TIME PARTIAL: CPT

## 2019-02-05 PROCEDURE — 82553 CREATINE MB FRACTION: CPT

## 2019-02-05 RX ORDER — ASPIRIN/CALCIUM CARB/MAGNESIUM 324 MG
325 TABLET ORAL ONCE
Qty: 0 | Refills: 0 | Status: COMPLETED | OUTPATIENT
Start: 2019-02-05 | End: 2019-02-05

## 2019-02-05 RX ORDER — CLONAZEPAM 1 MG
0.5 TABLET ORAL
Qty: 4 | Refills: 0
Start: 2019-02-05 | End: 2019-02-08

## 2019-02-05 RX ORDER — CLONAZEPAM 1 MG
0.5 TABLET ORAL ONCE
Qty: 0 | Refills: 0 | Status: DISCONTINUED | OUTPATIENT
Start: 2019-02-05 | End: 2019-02-05

## 2019-02-05 RX ADMIN — Medication 0.5 MILLIGRAM(S): at 15:16

## 2019-02-05 RX ADMIN — Medication 325 MILLIGRAM(S): at 15:16

## 2019-02-05 NOTE — ED ADULT TRIAGE NOTE - CHIEF COMPLAINT QUOTE
Patient c/o chest pain and palpitations started this morning . Also she run out of clonazepam last night . History of anxiety .

## 2019-02-05 NOTE — ED PROVIDER NOTE - MEDICAL DECISION MAKING DETAILS
53F pmh anxiety, on clonazepam 0.25 mg (1/2 tab 0.5mg) BID for CP and anxiety, takes daily. Only has few pills left and is worried she will run out before he comes back. Has approx 4 pills at this time. Also w/ cp which is common for pt s/p not taking her medication, since this am. EKG not acutely ischemic, cxr without acute findings. R/o acs though unlikely given hx, will refill medications for limited supply, advised importance of f/u w/ PMD and further discussion. 53F pmh anxiety, on clonazepam 0.25 mg (1/2 tab 0.5mg) BID for CP and anxiety, takes daily. Only has few pills left and is worried she will run out before he comes back. Has approx 4 pills at this time. Also w/ cp which is common for pt s/p not taking her medication, since this am. EKG not acutely ischemic, cxr without acute findings. R/o acs though unlikely given hx, will refill medications for limited supply, advised importance of f/u w/ PMD and further discussion. CP free at this time. Pt feeling improved after clonapin. Rx given for limited course meds. Unlikely PE, consider GERD, hiatal hernia.

## 2019-02-05 NOTE — ED PROVIDER NOTE - NSFOLLOWUPINSTRUCTIONS_ED_ALL_ED_FT
Immediately return to the Emergency Department or call 911 for any high fever, trouble breathing, severe vomiting, worsening pain, or any other concerns.     Anxiety    Generalized anxiety disorder (LEI) is a mental disorder. It is defined as anxiety that is not necessarily related to specific events or activities or is out of proportion to said events. Symptoms include restlessness, fatigue, difficulty concentrations, irritability and difficulty concentrating. It may interfere with life functions, including relationships, work, and school. If you were started on a medication, make sure to take exactly as prescribed and follow up with a psychiatrist.    SEEK IMMEDIATE MEDICAL CARE IF YOU HAVE ANY OF THE FOLLOWING SYMPTOMS: thoughts about hurting killing yourself, thoughts about hurting or killing somebody else, hallucinations, or worsening depression.

## 2019-02-05 NOTE — ED PROVIDER NOTE - OBJECTIVE STATEMENT
53F pmh anxiety, on clonazepam 0.25 mg (1/2 tab 0.5mg) BID for CP and anxiety, takes daily. She is worried because he is running low on her medications and her psychiatrist is out of the country, she contacted her PMD who stated could not refill medication because was controlled substance. She tried calling psychiatrist, but did not call back.  Also had CP this am after skipping her morning dose, which she states is common for when she skips her dose. CP was retrosternal/L sided, no radiation, no nausea, no vomiting, no sob, no faulkner, no cp w/ exertion.   States was previously w/u for her cp and advised its due to anxiety since november.   Next appt w/ psych is ~feb 25th. Last seen by PMD (new pmd) late january. PMD is Dr. Ivett Mireles, [facility] [address] [facility]

## 2019-02-05 NOTE — ED PROVIDER NOTE - PHYSICAL EXAMINATION
VITAL SIGNS: I have reviewed nursing notes and confirm.  CONSTITUTIONAL: Well-developed; well-nourished; in no acute distress.  SKIN: Skin is warm and dry, no acute rash.  HEAD: Normocephalic; atraumatic.  EYES:  EOM intact; conjunctiva and sclera clear.  ENT: No nasal discharge; airway clear.  NECK: Supple; Voluntary FROM  CARD: No rubs appreciated, Regular rate and rhythm.  RESP: No wheezes, no rales. No respiratory distress  ABD: Soft; non-distended; non-tender; no rebound or guarding  EXT: Normal ROM. No cyanosis or edema.  NEURO: Alert, oriented. Grossly unremarkable.  PSYCH: Cooperative, appropriate.

## 2019-02-05 NOTE — ED ADULT NURSE NOTE - OBJECTIVE STATEMENT
The pt is a 54 y/o female who came in to ED for evaluation of chest pain. Pt reports that she is nervous about running out of her Xanax and that her psychiatrist is out of town. Pt reports hx of anxiety. Pt states she came in to ED for evaluation of mild chest pain "because I didn't take my Xanax today since I am trying for it to last" and "I need a prescription for it" Pt denies dizziness, denies nausea, vomiting or any other symptoms. Pt appears comfortable.

## 2019-02-05 NOTE — ED ADULT NURSE NOTE - NSIMPLEMENTINTERV_GEN_ALL_ED
Implemented All Universal Safety Interventions:  [place] to call system. Call bell, personal items and telephone within reach. Instruct patient to call for assistance. Room bathroom lighting operational. Non-slip footwear when patient is off stretcher. Physically safe environment: no spills, clutter or unnecessary equipment. Stretcher in lowest position, wheels locked, appropriate side rails in place.

## 2019-02-09 DIAGNOSIS — R07.9 CHEST PAIN, UNSPECIFIED: ICD-10-CM

## 2019-02-09 DIAGNOSIS — R07.89 OTHER CHEST PAIN: ICD-10-CM

## 2019-02-09 DIAGNOSIS — Z79.899 OTHER LONG TERM (CURRENT) DRUG THERAPY: ICD-10-CM

## 2019-03-15 ENCOUNTER — APPOINTMENT (OUTPATIENT)
Dept: OBGYN | Facility: CLINIC | Age: 54
End: 2019-03-15

## 2019-04-08 ENCOUNTER — EMERGENCY (EMERGENCY)
Facility: HOSPITAL | Age: 54
LOS: 1 days | Discharge: HOME | End: 2019-04-08
Attending: EMERGENCY MEDICINE | Admitting: EMERGENCY MEDICINE
Payer: COMMERCIAL

## 2019-04-08 VITALS
HEART RATE: 52 BPM | TEMPERATURE: 98 F | DIASTOLIC BLOOD PRESSURE: 59 MMHG | RESPIRATION RATE: 18 BRPM | OXYGEN SATURATION: 100 % | SYSTOLIC BLOOD PRESSURE: 104 MMHG

## 2019-04-08 VITALS
HEIGHT: 61 IN | RESPIRATION RATE: 18 BRPM | OXYGEN SATURATION: 97 % | TEMPERATURE: 98 F | WEIGHT: 117.95 LBS | SYSTOLIC BLOOD PRESSURE: 115 MMHG | HEART RATE: 68 BPM | DIASTOLIC BLOOD PRESSURE: 73 MMHG

## 2019-04-08 DIAGNOSIS — M54.41 LUMBAGO WITH SCIATICA, RIGHT SIDE: ICD-10-CM

## 2019-04-08 DIAGNOSIS — N39.0 URINARY TRACT INFECTION, SITE NOT SPECIFIED: ICD-10-CM

## 2019-04-08 DIAGNOSIS — Z79.899 OTHER LONG TERM (CURRENT) DRUG THERAPY: ICD-10-CM

## 2019-04-08 DIAGNOSIS — M54.5 LOW BACK PAIN: ICD-10-CM

## 2019-04-08 DIAGNOSIS — M54.42 LUMBAGO WITH SCIATICA, LEFT SIDE: ICD-10-CM

## 2019-04-08 LAB
APPEARANCE UR: CLEAR — SIGNIFICANT CHANGE UP
BILIRUB UR-MCNC: NEGATIVE — SIGNIFICANT CHANGE UP
COLOR SPEC: YELLOW — SIGNIFICANT CHANGE UP
DIFF PNL FLD: ABNORMAL
GLUCOSE UR QL: NEGATIVE — SIGNIFICANT CHANGE UP
KETONES UR-MCNC: NEGATIVE — SIGNIFICANT CHANGE UP
LEUKOCYTE ESTERASE UR-ACNC: ABNORMAL
NITRITE UR-MCNC: NEGATIVE — SIGNIFICANT CHANGE UP
PH UR: 6.5 — SIGNIFICANT CHANGE UP (ref 5–8)
PROT UR-MCNC: NEGATIVE MG/DL — SIGNIFICANT CHANGE UP
SP GR SPEC: <=1.005 — SIGNIFICANT CHANGE UP (ref 1–1.03)
UROBILINOGEN FLD QL: 0.2 E.U./DL — SIGNIFICANT CHANGE UP

## 2019-04-08 PROCEDURE — 81001 URINALYSIS AUTO W/SCOPE: CPT

## 2019-04-08 PROCEDURE — 96372 THER/PROPH/DIAG INJ SC/IM: CPT

## 2019-04-08 PROCEDURE — 99284 EMERGENCY DEPT VISIT MOD MDM: CPT | Mod: 25

## 2019-04-08 PROCEDURE — 87086 URINE CULTURE/COLONY COUNT: CPT

## 2019-04-08 PROCEDURE — 99283 EMERGENCY DEPT VISIT LOW MDM: CPT

## 2019-04-08 RX ORDER — IBUPROFEN 200 MG
1 TABLET ORAL
Qty: 30 | Refills: 0
Start: 2019-04-08

## 2019-04-08 RX ORDER — DIAZEPAM 5 MG
5 TABLET ORAL ONCE
Qty: 0 | Refills: 0 | Status: DISCONTINUED | OUTPATIENT
Start: 2019-04-08 | End: 2019-04-08

## 2019-04-08 RX ORDER — LIDOCAINE 4 G/100G
1 CREAM TOPICAL
Qty: 10 | Refills: 0
Start: 2019-04-08 | End: 2019-05-07

## 2019-04-08 RX ORDER — METHOCARBAMOL 500 MG/1
2 TABLET, FILM COATED ORAL
Qty: 30 | Refills: 0
Start: 2019-04-08 | End: 2019-04-12

## 2019-04-08 RX ORDER — LIDOCAINE 4 G/100G
2 CREAM TOPICAL ONCE
Qty: 0 | Refills: 0 | Status: COMPLETED | OUTPATIENT
Start: 2019-04-08 | End: 2019-04-08

## 2019-04-08 RX ORDER — CEFPODOXIME PROXETIL 100 MG
1 TABLET ORAL
Qty: 14 | Refills: 0
Start: 2019-04-08 | End: 2019-04-14

## 2019-04-08 RX ORDER — KETOROLAC TROMETHAMINE 30 MG/ML
30 SYRINGE (ML) INJECTION ONCE
Qty: 0 | Refills: 0 | Status: DISCONTINUED | OUTPATIENT
Start: 2019-04-08 | End: 2019-04-08

## 2019-04-08 RX ADMIN — LIDOCAINE 2 PATCH: 4 CREAM TOPICAL at 17:05

## 2019-04-08 RX ADMIN — Medication 30 MILLIGRAM(S): at 17:05

## 2019-04-08 RX ADMIN — Medication 5 MILLIGRAM(S): at 17:05

## 2019-04-08 NOTE — ED ADULT NURSE NOTE - OBJECTIVE STATEMENT
PT presents complaining of bilateral flank pain and tenderness since friday. Pt reports burning urination in the mornings with subjective fevers and chills. Pt reports no recent hx of heavy lifting or back injury.

## 2019-04-08 NOTE — ED PROVIDER NOTE - NS ED ROS FT
Constitutional: No fever or chills.   Eyes: No pain, blurry vision, or discharge.  ENMT: No hearing changes, pain, discharge or infections. No neck pain or stiffness.  Cardiac: No chest pain, SOB or edema. No chest pain with exertion.  Respiratory: No cough or respiratory distress. No hemoptysis. No history of asthma or RAD.  GI: No nausea, vomiting, diarrhea   : See HPI  MS: See HPI  Neuro: No headache or weakness. No LOC.  Skin: No skin rash.   Endocrine: No history of thyroid disease or diabetes.  Except as documented in the HPI, all other systems are negative.

## 2019-04-08 NOTE — ED PROVIDER NOTE - PHYSICAL EXAMINATION
Constitutional: Well appearing, well nourished, awake, alert, oriented to person, place, time/situation and in no apparent distress.  ENMT: Airway patent. Normal MM  Eyes: Clear bilaterally  Cardiac: Normal rate, regular rhythm.  Heart sounds S1, S2.  No murmurs, rubs or gallops.  Respiratory: Breaths sounds equal and clear b/l. No increased WOB, tachypnea, hypoxia, or accessory mm use. Pt speaks in full sentences.   Gastrointestinal: Abd soft, NT, ND, NABS. No guarding, rebound, or rigidity. No pulsatile abdominal masses. No organomegaly appreciated. No CVAT   Musculoskeletal: Range of motion is not limited. Spine appears normal. No midline spinal ttp throughout the spine. + b/l lumbar paraspinal mm ttp. + SLR b/l. normal sensation. no saddle anesthesia. 5/5 strength in all mm groups in b/l LE. normal gait. 2+ DTR's b/l  Neuro: Alert and oriented x 3, face symmetric and speech fluent. Strength 5/5 x 4 ext and symmetric, nml gross motor movement, nml gait. No focal deficits noted.  Skin: Skin normal color for race, warm, dry and intact. No evidence of rash.  Psych: Alert and oriented to person, place, time/situation. normal mood and affect. no apparent risk to self or others.

## 2019-04-08 NOTE — ED PROVIDER NOTE - NSFOLLOWUPINSTRUCTIONS_ED_ALL_ED_FT
You were evaluated in the ED for both sides low back pain. Your pain is felt to be musculoskeletal in origin with some sciatica. You were treated with an anti-inflammatory (Toradol), a muscle relaxant (Valium), and a topical medication (Lidocaine) with relief in pain. You are being prescribed equivalent medications for pain to take at home. You are also being treated for a urinary infection. You are being prescribed an antibiotic (Cefpodoxime) for the infection. A urine culture was sent today and will result in 2-3 days. Please call [phone] for these results.    Low Back Strain    WHAT YOU NEED TO KNOW:    What is low back strain? Low back strain is an injury to your lower back muscles or tendons. Tendons are strong tissues that connect muscles to bones. The lower back supports most of your body weight and helps you move, twist, and bend.    What causes low back strain? Low back strain is usually caused by activities that increase stress on the lower back, such as exercise or injury. The following may increase your risk for low back strain:     You have had low back strain before.      You lift heavy objects with your back instead of your legs.      You do not warm up before you exercise.      You sit or stand for long periods of time.      You are overweight.    What are the signs and symptoms of low back strain?     Low back pain or muscle spasms           Stiffness or limited movement      Pain that goes down to the buttocks, groin, or legs      Pain that is worse with activity    How is low back strain diagnosed? An x-ray, CT scan, or MRI may be done to check for damage to your spine, muscles, or tendons. You may be given contrast liquid to help the tissues in your lower back show up better in the pictures. Tell the healthcare provider if you have ever had an allergic reaction to contrast liquid. Do not enter the MRI room with anything metal. Metal can cause serious injury. Tell the healthcare provider if you have any metal in or on your body.    How is low back strain treated?     Acetaminophen decreases pain and fever. It is available without a doctor's order. Ask how much to take and how often to take it. Follow directions. Acetaminophen can cause liver damage if not taken correctly.      NSAIDs, such as ibuprofen, help decrease swelling, pain, and fever. This medicine is available with or without a doctor's order. NSAIDs can cause stomach bleeding or kidney problems in certain people. If you take blood thinner medicine, always ask your healthcare provider if NSAIDs are safe for you. Always read the medicine label and follow directions.      Muscle relaxers help decrease pain and muscle spasms.      Prescription pain medicine may be given. Ask how to take this medicine safely.      Surgery may be needed if your strain is severe.    How can I manage my symptoms?     Rest as directed. You may need to rest in bed for a period of time after your injury. Do not lift heavy objects.       Apply ice on your back for 15 to 20 minutes every hour or as directed. Use an ice pack, or put crushed ice in a plastic bag. Cover it with a towel. Ice helps prevent tissue damage and decreases swelling and pain.      Apply heat on your lower back for 20 to 30 minutes every 2 hours for as many days as directed. Heat helps decrease pain and muscle spasms.      Slowly start to increase your activity as the pain decreases, or as directed.    How can low back strain be prevented?     Use correct body movements.   Bend at the hips and knees when you  objects. Do not bend from the waist. Use your leg muscles as you lift the load. Do not use your back. Keep the object close to your chest as you lift it. Try not to twist or lift anything above your waist.      Change your position often when you stand for long periods of time. Rest one foot on a small box or footrest, and then switch to the other foot often.      Try not to sit for long periods of time. When you do, sit in a straight-backed chair with your feet flat on the floor.      Never reach, pull, or push while you are sitting.      Warm up before you exercise. Do exercises that strengthen your back muscles. Ask your healthcare provider about the best exercise plan for you.      Maintain a healthy weight. Ask your healthcare provider how much you should weigh. Ask him to help you create a weight loss plan if you are overweight.     When should I seek immediate care?     You hear or feel a pop in your lower back.      You have increased swelling or pain in your lower back.      You have trouble moving your legs.      Your legs are numb.    When should I contact my healthcare provider?     You have a fever.      Your pain does not go away, even after treatment.      You have questions or concerns about your condition or care.    CARE AGREEMENT:    You have the right to help plan your care. Learn about your health condition and how it may be treated. Discuss treatment options with your healthcare providers to decide what care you want to receive. You always have the right to refuse treatment.     Sciatica    WHAT YOU NEED TO KNOW:    Sciatica is a condition that causes pain along your sciatic nerve. The sciatic nerve runs from your spine through both sides of your buttocks. It then runs down the back of your thigh, into your lower leg and foot. Your sciatic nerve may be compressed, inflamed, irritated, or stretched.          DISCHARGE INSTRUCTIONS:    Medicines:     NSAIDs: These medicines decrease swelling and pain. NSAIDs are available without a doctor's order. Ask your healthcare provider which medicine is right for you. Ask how much to take and when to take it. Take as directed. NSAIDs can cause stomach bleeding or kidney problems if not taken correctly.      Acetaminophen: This medicine decreases pain. Acetaminophen is available without a doctor's order. Ask how much to take and when to take it. Follow directions. Acetaminophen can cause liver damage if not taken correctly.      Muscle relaxers help decrease pain and muscle spasms.      Take your medicine as directed. Contact your healthcare provider if you think your medicine is not helping or if you have side effects. Tell him of her if you are allergic to any medicine. Keep a list of the medicines, vitamins, and herbs you take. Include the amounts, and when and why you take them. Bring the list or the pill bottles to follow-up visits. Carry your medicine list with you in case of an emergency.    Follow up with your healthcare provider as directed: Write down your questions so you remember to ask them during your visits.     Manage your symptoms:     Activity: Decrease your activity. Do not lift heavy objects or twist your back for at least 6 weeks. Slowly return to your usual activity.      Ice: Ice helps decrease swelling and pain. Ice may also help prevent tissue damage. Use an ice pack, or put crushed ice in a plastic bag. Cover it with a towel and place it on your low back or leg for 15 to 20 minutes every hour or as directed.      Heat: Heat helps decrease pain and muscle spasms. Apply heat on the area for 20 to 30 minutes every 2 hours for as many days as directed.       Physical therapy: You may need to see physical therapist to teach you exercises to help improve movement and strength, and to decrease pain. An occupational therapist teaches you skills to help with your daily activities.       Use assistive devices if directed: You may need to wear back support, such as a back brace. You may need crutches, a cane, or a walker to decrease stress on your lower back and leg muscles. Ask your healthcare provider for more information about assistive devices and how to use them correctly.    Self-care:     Avoid pressure on your back and legs: Do not lift heavy objects, or stand or sit for long periods of time.      Lift objects safely: Keep your back straight and bend your knees when you  an object. Do not bend or twist your back when you lift.      Maintain a healthy weight: Ask your healthcare provider how much you should weigh. Ask him to help you create a weight loss plan if you are overweight.       Exercise: Ask your healthcare provider about the best stretching, warmup, and exercise plan for you.     Contact your healthcare provider if:     You have pain in your lower back at night or when resting.      You have pain in your lower back with numbness below the knee.      You have weakness in one leg only.      You have questions or concerns about your condition or care.    Return to the emergency department if:     You have trouble holding back your urine or bowel movements.      You have weakness in both legs.      You have numbness in your groin or buttocks.      Urinary Tract Infection    A urinary tract infection (UTI) is an infection of any part of the urinary tract, which includes the kidneys, ureters, bladder, and urethra. Risk factors include ignoring your need to urinate, wiping back to front if female, being an uncircumcised male, and having diabetes or a weak immune system. Symptoms include frequent urination, pain or burning with urination, foul smelling urine, cloudy urine, pain in the lower abdomen, blood in the urine, and fever. If you were prescribed an antibiotic medicine, take it as told by your health care provider. Do not stop taking the antibiotic even if you start to feel better.    SEEK IMMEDIATE MEDICAL CARE IF YOU HAVE ANY OF THE FOLLOWING SYMPTOMS: severe back or abdominal pain, fever, inability to keep fluids or medicine down, dizziness/lightheadedness, or a change in mental status.

## 2019-04-08 NOTE — ED ADULT TRIAGE NOTE - CHIEF COMPLAINT QUOTE
pt c/o bilateral lower back pain since Friday with burning on urination today. denies any  significant medical history, fever/chills, hematuria, injury.

## 2019-04-08 NOTE — ED PROVIDER NOTE - CLINICAL SUMMARY MEDICAL DECISION MAKING FREE TEXT BOX
Pt p/w b/l LBP, w/ MSK characteristics, reproducible on exam, w/ some sciatica. no CVAT. Vague urinary complaints. No neuro complaints or deficits. Pt p/w b/l LBP, w/ MSK characteristics, reproducible on exam, w/ some sciatica. no CVAT. Vague urinary complaints. No neuro complaints or deficits. There is no midline ttp; no neurologic signs or symptoms on history or exam; no numbness/tingling or paresthesias; gait is normal. No bowel or bladder incontinence. No evidence of cord compression or cauda equina syndrome. In addition, there is no fever and no risk factors or evidence of epidural abscess. No evidence of cord transection or metastatic process or other acute spinal cord injury. No evidence of AAA/dissection. Analgesia, check UA, re-eval.

## 2019-04-08 NOTE — ED PROVIDER NOTE - OBJECTIVE STATEMENT
Pt w/ PMHx anxiety p/w back pain, onset 4 days ago. The pain is located in the diffuse lumbar back. Pt denies midline pain. The pain sometimes radiates to the L thigh. 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 The pain is worse w/ movement and certain positions. Pt reports today she had pain in her midline lower abd / suprapubic area, and had pain in her b/l back when urinating x 1. No dysuria, hematuria, frequency, urgency, or hesitancy. No F/c. No n/v/d or constipation. last BM yesterday. No bowel or bladder dysfunction. No LE numbness / tingling, or weakness. Pt took Tylenol at 9 am for the pain w/ partial relief of pain.

## 2019-04-08 NOTE — ED ADULT NURSE NOTE - CHPI ED NUR SYMPTOMS NEG
no constipation/no fatigue/no bowel dysfunction/no motor function loss/no numbness/no difficulty bearing weight/no neck tenderness/no tingling/no anorexia

## 2019-04-08 NOTE — ED PROVIDER NOTE - PROGRESS NOTE DETAILS
Pt feeling much better. Will tx for MSK pain + UTI. Instructed to call the call back line in 2-3 days for urine cx results. Will d/c w/ f/u PCP Pt feeling much better. Will tx for MSK pain + UTI. Instructed to call the call back line in 2-3 days for urine cx results. Will d/c w/ f/u PCP. Normal gait on dc

## 2019-04-08 NOTE — ED PROVIDER NOTE - CARE PLAN
Principal Discharge DX:	Bilateral low back pain with sciatica, sciatica laterality unspecified, unspecified chronicity  Secondary Diagnosis:	Urinary tract infection without hematuria, site unspecified

## 2019-04-09 LAB
CULTURE RESULTS: NO GROWTH — SIGNIFICANT CHANGE UP
SPECIMEN SOURCE: SIGNIFICANT CHANGE UP

## 2019-11-25 ENCOUNTER — APPOINTMENT (OUTPATIENT)
Dept: INTERNAL MEDICINE | Facility: CLINIC | Age: 54
End: 2019-11-25
Payer: MEDICAID

## 2019-11-25 VITALS
WEIGHT: 122 LBS | OXYGEN SATURATION: 99 % | DIASTOLIC BLOOD PRESSURE: 66 MMHG | HEIGHT: 59 IN | BODY MASS INDEX: 24.6 KG/M2 | TEMPERATURE: 97.6 F | HEART RATE: 54 BPM | SYSTOLIC BLOOD PRESSURE: 113 MMHG

## 2019-11-25 DIAGNOSIS — B97.89 ACUTE UPPER RESPIRATORY INFECTION, UNSPECIFIED: ICD-10-CM

## 2019-11-25 DIAGNOSIS — Z00.00 ENCOUNTER FOR GENERAL ADULT MEDICAL EXAMINATION W/OUT ABNORMAL FINDINGS: ICD-10-CM

## 2019-11-25 DIAGNOSIS — J06.9 ACUTE UPPER RESPIRATORY INFECTION, UNSPECIFIED: ICD-10-CM

## 2019-11-25 PROCEDURE — 99214 OFFICE O/P EST MOD 30 MIN: CPT | Mod: GC

## 2019-11-26 PROBLEM — Z00.00 HEALTH CARE MAINTENANCE: Status: ACTIVE | Noted: 2019-11-26

## 2020-01-31 ENCOUNTER — APPOINTMENT (OUTPATIENT)
Dept: OBGYN | Facility: CLINIC | Age: 55
End: 2020-01-31
Payer: MEDICAID

## 2020-01-31 VITALS
BODY MASS INDEX: 25.35 KG/M2 | DIASTOLIC BLOOD PRESSURE: 60 MMHG | HEIGHT: 59 IN | SYSTOLIC BLOOD PRESSURE: 100 MMHG | WEIGHT: 125.75 LBS

## 2020-01-31 DIAGNOSIS — N95.2 POSTMENOPAUSAL ATROPHIC VAGINITIS: ICD-10-CM

## 2020-01-31 DIAGNOSIS — Z01.419 ENCOUNTER FOR GYNECOLOGICAL EXAMINATION (GENERAL) (ROUTINE) W/OUT ABNORMAL FINDINGS: ICD-10-CM

## 2020-01-31 PROCEDURE — 99386 PREV VISIT NEW AGE 40-64: CPT

## 2020-01-31 RX ORDER — ESCITALOPRAM OXALATE 5 MG/1
5 TABLET ORAL DAILY
Qty: 30 | Refills: 0 | Status: DISCONTINUED | COMMUNITY
Start: 2019-02-01 | End: 2020-01-31

## 2020-01-31 NOTE — PHYSICAL EXAM
[Awake] : awake [Alert] : alert [Soft] : soft [Oriented x3] : oriented to person, place, and time [Normal] : uterus [Uterine Adnexae] : were not tender and not enlarged [No Bleeding] : there was no active vaginal bleeding [Mass] : no breast mass [Acute Distress] : no acute distress [Nipple Discharge] : no nipple discharge [Axillary LAD] : no axillary lymphadenopathy [Tender] : non tender

## 2020-01-31 NOTE — HISTORY OF PRESENT ILLNESS
[Definite:  ___ (Date)] : the last menstrual period was [unfilled] [Currently In Menopause] : currently in menopause [Post-Menopause, No Sxs] : post-menopausal, currently without symptoms [Last Mammogram ___] : Last Mammogram was [unfilled] [Last Pap ___] : Last cervical pap smear was [unfilled] [Sexually Active] : is sexually active [Monogamous] : is monogamous [Male ___] : [unfilled] male [Healthy Diet] : a healthy diet [Good] : being in good health [1 Year Ago] : 1 year ago [Regular Exercise] : regular exercise [Weight Concerns] : no concerns with her weight

## 2020-02-05 LAB — CYTOLOGY CVX/VAG DOC THIN PREP: ABNORMAL

## 2020-03-13 ENCOUNTER — APPOINTMENT (OUTPATIENT)
Dept: PULMONOLOGY | Facility: CLINIC | Age: 55
End: 2020-03-13
Payer: MEDICAID

## 2020-03-13 VITALS
DIASTOLIC BLOOD PRESSURE: 70 MMHG | HEART RATE: 70 BPM | SYSTOLIC BLOOD PRESSURE: 100 MMHG | OXYGEN SATURATION: 97 % | BODY MASS INDEX: 25.2 KG/M2 | WEIGHT: 125 LBS | TEMPERATURE: 97.5 F | HEIGHT: 59 IN

## 2020-03-13 PROCEDURE — 71046 X-RAY EXAM CHEST 2 VIEWS: CPT

## 2020-03-15 NOTE — PROCEDURE
[FreeTextEntry1] : negative chest film in patient with postive ppd,\par \par forms filled out.for work

## 2020-09-24 ENCOUNTER — APPOINTMENT (OUTPATIENT)
Dept: OBGYN | Facility: CLINIC | Age: 55
End: 2020-09-24

## 2020-10-21 ENCOUNTER — APPOINTMENT (OUTPATIENT)
Dept: OBGYN | Facility: CLINIC | Age: 55
End: 2020-10-21

## 2020-11-04 ENCOUNTER — TRANSCRIPTION ENCOUNTER (OUTPATIENT)
Age: 55
End: 2020-11-04

## 2020-12-10 ENCOUNTER — APPOINTMENT (OUTPATIENT)
Dept: INTERNAL MEDICINE | Facility: CLINIC | Age: 55
End: 2020-12-10

## 2020-12-21 PROBLEM — J06.9 VIRAL URI WITH COUGH: Status: RESOLVED | Noted: 2019-11-25 | Resolved: 2020-12-21

## 2021-01-07 ENCOUNTER — NON-APPOINTMENT (OUTPATIENT)
Age: 56
End: 2021-01-07

## 2021-01-07 ENCOUNTER — TRANSCRIPTION ENCOUNTER (OUTPATIENT)
Age: 56
End: 2021-01-07

## 2021-03-29 ENCOUNTER — EMERGENCY (EMERGENCY)
Facility: HOSPITAL | Age: 56
LOS: 1 days | Discharge: HOME | End: 2021-03-29
Attending: EMERGENCY MEDICINE | Admitting: EMERGENCY MEDICINE
Payer: MEDICAID

## 2021-03-29 VITALS
OXYGEN SATURATION: 99 % | RESPIRATION RATE: 18 BRPM | WEIGHT: 117.95 LBS | HEIGHT: 61 IN | SYSTOLIC BLOOD PRESSURE: 106 MMHG | HEART RATE: 61 BPM | DIASTOLIC BLOOD PRESSURE: 66 MMHG | TEMPERATURE: 98 F
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
## 2021-03-29 DIAGNOSIS — R00.1 BRADYCARDIA, UNSPECIFIED: ICD-10-CM

## 2021-03-29 DIAGNOSIS — M25.511 PAIN IN RIGHT SHOULDER: ICD-10-CM

## 2021-03-29 DIAGNOSIS — Z87.19 PERSONAL HISTORY OF OTHER DISEASES OF THE DIGESTIVE SYSTEM: ICD-10-CM

## 2021-03-29 DIAGNOSIS — Z79.2 LONG TERM (CURRENT) USE OF ANTIBIOTICS: ICD-10-CM

## 2021-03-29 DIAGNOSIS — Z79.899 OTHER LONG TERM (CURRENT) DRUG THERAPY: ICD-10-CM
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
## 2021-03-29 PROCEDURE — 73030 X-RAY EXAM OF SHOULDER: CPT

## 2021-03-29 PROCEDURE — 93005 ELECTROCARDIOGRAM TRACING: CPT

## 2021-03-29 PROCEDURE — 73030 X-RAY EXAM OF SHOULDER: CPT | Mod: 26,RT

## 2021-03-29 PROCEDURE — 99283 EMERGENCY DEPT VISIT LOW MDM: CPT | Mod: 25

## 2021-03-29 PROCEDURE — 99284 EMERGENCY DEPT VISIT MOD MDM: CPT

## 2021-03-29 RX ORDER — IBUPROFEN 200 MG
600 TABLET ORAL ONCE
Refills: 0 | Status: COMPLETED | OUTPATIENT
Start: 2021-03-29 | End: 2021-03-29

## 2021-03-29 RX ADMIN — Medication 600 MILLIGRAM(S): at 16:13

## 2021-03-29 RX ADMIN — Medication 600 MILLIGRAM(S): at 16:18

## 2021-03-29 NOTE — ED PROVIDER NOTE - CLINICAL SUMMARY MEDICAL DECISION MAKING FREE TEXT BOX
56 y/o F right side dominant, presents to ED with worsening right shoulder pain. Xray of right shoulder shows no acute fx or dislocation. Pt was given ibuprofen, advised to take tylenol and Motrin for pain management. Pt advised to follow up with orthopedist. 54 y/o F right side dominant, presents to ED with worsening right shoulder pain. Xray of right shoulder shows no acute fx or dislocation. Pt was given ibuprofen, advised to take tylenol and Motrin for pain management. Pt advised to follow up with orthopedist. Stable for dc.

## 2021-03-29 NOTE — ED PROVIDER NOTE - CARE PROVIDER_API CALL
Titus Xie)  Orthopaedic Surgery  [address] [place] [address], [address]  [place] [address]  Phone: [phone]  Fax: [phone]  Follow Up Time:     Ken Murphy)  Orthopaedic Surgery  [address] [place] [address]  Phone: [phone]  Fax: [phone]  Follow Up Time:

## 2021-03-29 NOTE — ED ADULT NURSE NOTE - OBJECTIVE STATEMENT
Chronic right shoulder pain, no recent fall or injury, no obvious deformity, pain worse the past 2 weeks and worse when heavy lifting.

## 2021-03-29 NOTE — ED ADULT TRIAGE NOTE - CHIEF COMPLAINT QUOTE
Pt reports right shoulder pain that "started 5 years ago after lifting weights and it's never been the same," reports worsening pain "with lifting heavy things" starting 2 weeks ago. Pt denies numbness, tingling, chest pain, sob.

## 2021-03-29 NOTE — ED PROVIDER NOTE - MUSCULOSKELETAL, MLM
Full ROM of the right shoulder with some pain. No tenderness to palpation of the shoulder joint. No swelling. Good distal pulses.

## 2021-03-29 NOTE — ED PROVIDER NOTE - NSFOLLOWUPCLINICS_GEN_ALL_ED_FT
[facility]  Family Medicine  [address]  [place], [place] [address]  Phone: [phone]  Fax:   Follow Up Time: 4-6 Days

## 2021-03-29 NOTE — ED PROVIDER NOTE - OBJECTIVE STATEMENT
54 y/o F, right side dominant, other wise healthy, no chronic medications. Presents to ED c/o right shoulder pain. The pain first began 5 years ago when she injured her shoulder while lifting weights, and she did not seek medical help. However the pain has worsened over the past few weeks. It is worse when laying in bed on the right side and holding certain positions and movements. 2 days ago, pt was lifting heavy bags which exacerbated the pain. Denies new injuries, falls, trauma. Denies chest pain, sob, rashes, and skin changes. Pt has not taken any medications for pain management. 56 y/o F, right side dominant, otherwise healthy, no chronic medications presents to ED c/o right shoulder pain. The pain first began 5 years ago when she injured her shoulder while lifting weights, and she did not seek medical help. However the pain has worsened over the past few weeks. It is worse when laying in bed on the right side and holding certain positions and movements. 2 days ago, pt was lifting heavy bags which exacerbated the pain. Denies new injuries, falls, trauma. Denies chest pain, sob, rashes, and skin changes. Pt has not taken any medications for pain management.

## 2021-03-29 NOTE — ED PROVIDER NOTE - PATIENT PORTAL LINK FT
You can access the FollowMyHealth Patient Portal offered by [facility] by registering at the following website: [email or web address]. By joining [facility]’s FollowMyHealth portal, you will also be able to view your health information using other applications (apps) compatible with our system.

## 2021-06-26 ENCOUNTER — EMERGENCY (EMERGENCY)
Facility: HOSPITAL | Age: 56
LOS: 1 days | Discharge: HOME | End: 2021-06-26
Attending: EMERGENCY MEDICINE | Admitting: EMERGENCY MEDICINE
Payer: MEDICAID

## 2021-06-26 VITALS
SYSTOLIC BLOOD PRESSURE: 109 MMHG | DIASTOLIC BLOOD PRESSURE: 66 MMHG | RESPIRATION RATE: 16 BRPM | TEMPERATURE: 98 F | OXYGEN SATURATION: 100 % | HEART RATE: 60 BPM

## 2021-06-26 VITALS
SYSTOLIC BLOOD PRESSURE: 130 MMHG | HEART RATE: 70 BPM | RESPIRATION RATE: 16 BRPM | TEMPERATURE: 98 F | OXYGEN SATURATION: 100 % | DIASTOLIC BLOOD PRESSURE: 70 MMHG | HEIGHT: 61 IN | WEIGHT: 117.95 LBS

## 2021-06-26 DIAGNOSIS — F32.9 MAJOR DEPRESSIVE DISORDER, SINGLE EPISODE, UNSPECIFIED: ICD-10-CM

## 2021-06-26 DIAGNOSIS — K21.9 GASTRO-ESOPHAGEAL REFLUX DISEASE WITHOUT ESOPHAGITIS: ICD-10-CM

## 2021-06-26 DIAGNOSIS — Z87.891 PERSONAL HISTORY OF NICOTINE DEPENDENCE: ICD-10-CM

## 2021-06-26 DIAGNOSIS — R07.89 OTHER CHEST PAIN: ICD-10-CM

## 2021-06-26 DIAGNOSIS — F41.9 ANXIETY DISORDER, UNSPECIFIED: ICD-10-CM

## 2021-06-26 DIAGNOSIS — R11.0 NAUSEA: ICD-10-CM

## 2021-06-26 DIAGNOSIS — R10.13 EPIGASTRIC PAIN: ICD-10-CM

## 2021-06-26 LAB
ANION GAP SERPL CALC-SCNC: 11 MMOL/L — SIGNIFICANT CHANGE UP (ref 5–17)
BASOPHILS # BLD AUTO: 0.03 K/UL — SIGNIFICANT CHANGE UP (ref 0–0.2)
BASOPHILS NFR BLD AUTO: 0.8 % — SIGNIFICANT CHANGE UP (ref 0–2)
BUN SERPL-MCNC: 8 MG/DL — SIGNIFICANT CHANGE UP (ref 7–23)
CALCIUM SERPL-MCNC: 9.4 MG/DL — SIGNIFICANT CHANGE UP (ref 8.4–10.5)
CHLORIDE SERPL-SCNC: 104 MMOL/L — SIGNIFICANT CHANGE UP (ref 96–108)
CO2 SERPL-SCNC: 26 MMOL/L — SIGNIFICANT CHANGE UP (ref 22–31)
CREAT SERPL-MCNC: 0.56 MG/DL — SIGNIFICANT CHANGE UP (ref 0.5–1.3)
EOSINOPHIL # BLD AUTO: 0.1 K/UL — SIGNIFICANT CHANGE UP (ref 0–0.5)
EOSINOPHIL NFR BLD AUTO: 2.8 % — SIGNIFICANT CHANGE UP (ref 0–6)
GLUCOSE SERPL-MCNC: 90 MG/DL — SIGNIFICANT CHANGE UP (ref 70–99)
HCT VFR BLD CALC: 38 % — SIGNIFICANT CHANGE UP (ref 34.5–45)
HGB BLD-MCNC: 12.3 G/DL — SIGNIFICANT CHANGE UP (ref 11.5–15.5)
IMM GRANULOCYTES NFR BLD AUTO: 0 % — SIGNIFICANT CHANGE UP (ref 0–1.5)
LYMPHOCYTES # BLD AUTO: 1.58 K/UL — SIGNIFICANT CHANGE UP (ref 1–3.3)
LYMPHOCYTES # BLD AUTO: 43.8 % — SIGNIFICANT CHANGE UP (ref 13–44)
MCHC RBC-ENTMCNC: 23.2 PG — LOW (ref 27–34)
MCHC RBC-ENTMCNC: 32.4 GM/DL — SIGNIFICANT CHANGE UP (ref 32–36)
MCV RBC AUTO: 71.7 FL — LOW (ref 80–100)
MONOCYTES # BLD AUTO: 0.24 K/UL — SIGNIFICANT CHANGE UP (ref 0–0.9)
MONOCYTES NFR BLD AUTO: 6.6 % — SIGNIFICANT CHANGE UP (ref 2–14)
NEUTROPHILS # BLD AUTO: 1.66 K/UL — LOW (ref 1.8–7.4)
NEUTROPHILS NFR BLD AUTO: 46 % — SIGNIFICANT CHANGE UP (ref 43–77)
NRBC # BLD: 0 /100 WBCS — SIGNIFICANT CHANGE UP (ref 0–0)
PLATELET # BLD AUTO: 276 K/UL — SIGNIFICANT CHANGE UP (ref 150–400)
POTASSIUM SERPL-MCNC: 3.8 MMOL/L — SIGNIFICANT CHANGE UP (ref 3.5–5.3)
POTASSIUM SERPL-SCNC: 3.8 MMOL/L — SIGNIFICANT CHANGE UP (ref 3.5–5.3)
RBC # BLD: 5.3 M/UL — HIGH (ref 3.8–5.2)
RBC # FLD: 15.7 % — HIGH (ref 10.3–14.5)
SODIUM SERPL-SCNC: 141 MMOL/L — SIGNIFICANT CHANGE UP (ref 135–145)
TROPONIN T SERPL-MCNC: <0.01 NG/ML — SIGNIFICANT CHANGE UP (ref 0–0.01)
WBC # BLD: 3.61 K/UL — LOW (ref 3.8–10.5)
WBC # FLD AUTO: 3.61 K/UL — LOW (ref 3.8–10.5)

## 2021-06-26 PROCEDURE — 36415 COLL VENOUS BLD VENIPUNCTURE: CPT

## 2021-06-26 PROCEDURE — 85025 COMPLETE CBC W/AUTO DIFF WBC: CPT

## 2021-06-26 PROCEDURE — 96374 THER/PROPH/DIAG INJ IV PUSH: CPT

## 2021-06-26 PROCEDURE — 84484 ASSAY OF TROPONIN QUANT: CPT

## 2021-06-26 PROCEDURE — 96375 TX/PRO/DX INJ NEW DRUG ADDON: CPT

## 2021-06-26 PROCEDURE — 93005 ELECTROCARDIOGRAM TRACING: CPT

## 2021-06-26 PROCEDURE — 99285 EMERGENCY DEPT VISIT HI MDM: CPT

## 2021-06-26 PROCEDURE — 80048 BASIC METABOLIC PNL TOTAL CA: CPT

## 2021-06-26 PROCEDURE — 93010 ELECTROCARDIOGRAM REPORT: CPT

## 2021-06-26 PROCEDURE — 71046 X-RAY EXAM CHEST 2 VIEWS: CPT

## 2021-06-26 PROCEDURE — 71046 X-RAY EXAM CHEST 2 VIEWS: CPT | Mod: 26

## 2021-06-26 PROCEDURE — 99284 EMERGENCY DEPT VISIT MOD MDM: CPT | Mod: 25

## 2021-06-26 RX ORDER — OMEPRAZOLE 10 MG/1
1 CAPSULE, DELAYED RELEASE ORAL
Qty: 30 | Refills: 0
Start: 2021-06-26 | End: 2021-07-25

## 2021-06-26 RX ORDER — ONDANSETRON 8 MG/1
4 TABLET, FILM COATED ORAL ONCE
Refills: 0 | Status: COMPLETED | OUTPATIENT
Start: 2021-06-26 | End: 2021-06-26

## 2021-06-26 RX ORDER — FAMOTIDINE 10 MG/ML
20 INJECTION INTRAVENOUS ONCE
Refills: 0 | Status: COMPLETED | OUTPATIENT
Start: 2021-06-26 | End: 2021-06-26

## 2021-06-26 RX ADMIN — Medication 30 MILLILITER(S): at 11:04

## 2021-06-26 RX ADMIN — FAMOTIDINE 20 MILLIGRAM(S): 10 INJECTION INTRAVENOUS at 11:04

## 2021-06-26 NOTE — ED PROVIDER NOTE - PATIENT PORTAL LINK FT
You can access the FollowMyHealth Patient Portal offered by [facility] by registering at the following website: [email or web address]. By joining [facility]’s FollowMyHealth portal, you will also be able to view your health information using other applications (apps) compatible with our system.

## 2021-06-26 NOTE — ED PROVIDER NOTE - NSFOLLOWUPINSTRUCTIONS_ED_ALL_ED_FT
Chest pain  GERD  Anxiety    Take omeprazole once a day.  Add tums/maalox/mylanta etc as directed for additional relief.  Avoid spicy and/or acidic foods (citrus, tomatoes, etc), alcohol, caffeine, and NSAID medications (ibuprofen, aleve, advil, motrin, etc).      Follow up with your pmd and a cardiologist.  Also contact your therapist.    Return for increased pain, difficulty breathing, palpitations, vomiting or diarrhea, fever, black/bloody stools, thoughts of hurting yourself or others,  any other concerns.     Chest Pain    Chest pain can be caused by many different conditions which may or may not be dangerous. Causes include heartburn, lung infections, heart attack, blood clot in lungs, skin infections, strain or damage to muscle, cartilage, or bones, etc. In addition to a history and physical examination, an electrocardiogram (ECG) or other lab tests may have been performed to determine the cause of your chest pain. Follow up with your primary care provider or with a cardiologist as instructed.     SEEK IMMEDIATE MEDICAL CARE IF YOU HAVE ANY OF THE FOLLOWING SYMPTOMS: worsening chest pain, coughing up blood, unexplained back/neck/jaw pain, severe abdominal pain, dizziness or lightheadedness, fainting, shortness of breath, sweaty or clammy skin, vomiting, or racing heart beat. These symptoms may represent a serious problem that is an emergency. Do not wait to see if the symptoms will go away. Get medical help right away. Call 911 and do not drive yourself to the hospital.    Anxiety    Generalized anxiety disorder (LEI) is a mental disorder. It is defined as anxiety that is not necessarily related to specific events or activities or is out of proportion to said events. Symptoms include restlessness, fatigue, difficulty concentrations, irritability and difficulty concentrating. It may interfere with life functions, including relationships, work, and school. If you were started on a medication, make sure to take exactly as prescribed and follow up with a psychiatrist.    SEEK IMMEDIATE MEDICAL CARE IF YOU HAVE ANY OF THE FOLLOWING SYMPTOMS: thoughts about hurting killing yourself, thoughts about hurting or killing somebody else, hallucinations, or worsening depression.    ---------------  Here are some psychiatric resources:   Walk-in clinics (no insurance needed)    [address] ([address] [facility] [address]    [phone]    [facility]    [address] [facility] [address]    [phone]    Resident clinics  [place] [phone]    [place] [phone]    Psychotherapy +/- medications: clinics (sliding scale, some insurances)    [facility] for Psychotherapy (2 sites)    [facility]    [address] [place] [address]    [phone]    [facility]    [address] 7th [place] [address]    [phone]    [facility]    [address] [place] [address]    [phone], [phone]. [phone]    (Medicare, Blue Cross Blue Shield, HIP, Deniz, Aetna, [place], MVP, Health Plus, Metro Plus, Neighborhood, Affinity, Wellcare)    [facility]    [address] (between [address])    [phone]    [facility] (3 sites)    [address] [place] [address]. [place] [address]    [phone]    [address] [place] [place] [address]    [phone]    [address] [address] [place], [place] [address] (@ [address])    [phone]    [facility]    **catchment area: [address], from [facility] to the [facility]**    [facility]    [phone]    [facility]    [address], [place], [place] [address] [phone]    [facility].    [address] [place] [address]    PHONE: [phone]    [facility] ([facility] [place])    [address] [place] [address]    [phone]    [facility]    [address] [place] [address]    [phone]    (free treatme    nt; run by [facility])    [facility]rapy [facility]    (**For higher functioning patients. Meds and therapy, quality providers, takes many commercial insurance plans. There may be a long wait list, but an excellent option for people with managed care)    [place] Office: [address] [phone]  [place] Office: [address] [facility] [phone]    [facility] [place] [facility] (Miriam SALES [facility])    [address] [address] [phone]    Sliding scale, including special fees for students/unemployed, but does not take Medicare/Medicaid.    [facility]    [phone]    [facility]    [phone]    [facility] (depression research program[facility]. [facility])    [facility]    [address] [place] [address] [phone]    [facility], [address] [place] [address]    [phone]     [facility]: [facility]    [address] [place] [address]    [phone]    [facility]    [address], [place] [address]    [phone]    [facility]    [address] [facility]    [phone]    [facility]    [address] [facility]    [phone]    [facility]    [phone]    [facility] for psychotherapy    [address] [place] [address]    [phone]    [facility]    [address] [place] [address] [phone]    [facility]: [facility]    [address] [facility] [place]    [phone]    [facility]    [phone]    [facility] (accepts medicare and medicaid)    [address] [place], [place]    [phone]    [facility] (accepts medicare and medicaid)    [phone]    [address] [facility] [address], 2nd Floor, [place], [place]    [facility] (medicare and medicaid)    [phone]    [address] [facility]    [facility] mental [facility] clinic [phone]    [facility] diagnostic treatment center [phone]    [facility] [phone]    [facility] [phone]    [facility] (JBS): [facility]    [address] [place] [address]    [phone]    [facility]    [phone], ext. [phone]    [address] [place] [address]    [facility] [place] [facility]    [phone] [address] [place] [address]    [facility] Tra[facility] 1 Clinic    [phone]    [address] [place] [address]    [facility] of [place]    [phone]    [address] [place] [address]    FE    [address] [place] [address]    Phone: [phone]    [facility] [place]    [address] [facility] [address], [place] [address]    [phone], intake extension: [phone]    [facility]    [phone] ([phone])    [facility]    [address] [facility].    [phone]    [facility] Adult outpatient psychiatry    [address] [facility] [place]    [phone]    Medicare: [facility] Counseling and Guidance Service    [address] [facility] ([address])    [phone]    [place] [facility]    [facility] [place] (at [address])    [phone]    [facility] (has a senior center, w a psychiatrist, therapists and internists)    [address] [facility]    [phone]    [facility] [place]    [address] ([address] and [address])    [phone]    [facility]    [address]    [phone]    [facility]    Psychotherapy and Spirituality [place] (PSI).    [address] [facility], [address]. [place].    [phone]    Specialty services    Geriatric resources/case management    provider who specializes in case management: Soheila Gagnon [phone]    SPOP http://www.spop.org/about [address] [phone]    [facility] Care [email or web address].[email or web address]    [facility] [address] [phone]    Reproductive psychiatry    [facility]    Laura Briggs [phone] or [email or web address]    (*this is a consultation service first and foremost, may not be able to follow patient, and in such situations, would not be an appropriate discharge plan; always call Laura yourself first to review case to see if it would be an appropriate referral)    PTSD/trauma    PTSD Research study, [facility]    Hilda Vaz [phone]    or Jeff Aiken [phone] or [phone]    [email or web address]    [facility]    [address] [facility] [address]    [phone]    (free of charge)    Sexual Assault and Violence Intervention Program ([facility])    [place] [phone]    [place] [phone]    [facility] Against Sexual Assault    http://nyscasa.org/?q=responding/crisiscenters    Grief/bereavement counseling    [facility]plicated Grief Program    [facility]    [address] [place] [address]    [email or web address]    [phone]    Eating disorders    [facility]    *outpatient, [facility]:    [address], [place] [address]    [phone]    *inpatient and outpatient, national listin5-087-ZKOAFUS http://[email or web address].[email or web address]    [place] [facility] (eating d/o program, groups)    [phone]    [facility] (high functioning adults, out of pocket)    [address]    [phone]    Day programs    [facility] (high functioning adults, out of pocket)    [address]    [phone]    [facility] PHP    Alfreda Srivastava [phone]    PW CDTP (lower functioning)    Francia Allen [phone]    DBT programs    [facility]    [address]    [phone]    [facility]WC CDTP DBT-track (no active substance use)    Marlene Baltazar [phone]    Cognitive impairment    [facility] Disorders Program    [phone]    Substance abuse resources    http://www.samhsa.gov/    [facility] ([facility])    [address] [place] [address]    [phone] (**please note, it is [phone], not [phone]...)    Medicaid, Aetna, [place] BC/BS, and self-payment, sliding scale    Addiction [place]    [address] [place], [place] [address]    [phone]    [facility]    [address], [place] [address]    [phone]    [facility] [place] (caters to a more professional clientele)    [address]    [phone]    [facility] Change (out of pocket expenses, higher end)    [address] [facility] [address] [address])    [phone]    [facility] (CUBP)    [address], 12th [place] [address]    [phone]    [email or web address]    [email or web address]    [facility]    [address] [facility], room #225    [phone]    [facility] Psychiatric and Addictive Disorders (COPAD)    [phone]    Methadone questions?    Tanvi Vasquez    [phone] or [phone]    INPATIENT rehabs:    [facility]    [facility] [place] [facility]    [phone]    [facility]    Evaluation Unit    Outpatient Services    Inpatient Stabilization & Rehabilitation    [address] [place] [address]    [phone]    [facility]    Evaluation Unit    Inpatient Stabilization    [address] [place], [place] [address] [phone]    [facility]    [address] [place] [address]    [phone]    [facility]    [phone]    [address] [place] [address]    [phone]    [address] [place] [address]    [address] [address]    [facility]    [phone]    [email or web address]    [place], [place]    [facility]    [phone]    [address] [place], [place] [address]    [facility]    [facility]    [phone]    www.[email or web address]    Neuropsych Testing:    Dr. Soni Rooney [phone] ([email or web address])    cognitive rehab, Soni Head [phone].    CBT    [facility] [phone]    [facility] () [phone]    [facility] ([facility]) [phone]    [facility] () [phone]    [place] [facility] program -- contact Chaitanya Cabrera [phone]    [facility] for anxiety and related disorders ([facility]) [phone]    CBT-DBT associates -- [facility] [phone]    [email or web address]    [email or web address]

## 2021-06-26 NOTE — ED ADULT TRIAGE NOTE - CHIEF COMPLAINT QUOTE
Pt reports chest pain, nausea starting yesterday "but this usually happens when I have anxiety." Pt denies SI/HI, no etoh/drug use.

## 2021-06-26 NOTE — ED ADULT NURSE NOTE - OBJECTIVE STATEMENT
Patient alert and oriented x 3 came c/o sudden onset of chest pain with sob , anxiety , headache and nausea since yesterday . Pt. reported she is dealing with lot of stress at this time that aggravates anxiety . Pt. is not taking any psych meds at this time . Seen psychiatrist 2 yrs ago . Denies any SI/ HV nor hallucinations . Seen and examined by Dr. Cantu , all labs sent , medicated as ordered .

## 2021-06-26 NOTE — ED PROVIDER NOTE - CARE PLAN
Principal Discharge DX:	Chest pain  Secondary Diagnosis:	GERD (gastroesophageal reflux disease)  Secondary Diagnosis:	Anxiety

## 2021-07-11 ENCOUNTER — EMERGENCY (EMERGENCY)
Facility: HOSPITAL | Age: 56
LOS: 1 days | Discharge: HOME | End: 2021-07-11
Admitting: EMERGENCY MEDICINE
Payer: MEDICAID

## 2021-07-11 VITALS
RESPIRATION RATE: 18 BRPM | HEART RATE: 76 BPM | DIASTOLIC BLOOD PRESSURE: 62 MMHG | SYSTOLIC BLOOD PRESSURE: 128 MMHG | OXYGEN SATURATION: 98 %

## 2021-07-11 VITALS
HEIGHT: 62 IN | SYSTOLIC BLOOD PRESSURE: 134 MMHG | OXYGEN SATURATION: 100 % | DIASTOLIC BLOOD PRESSURE: 69 MMHG | WEIGHT: 117.95 LBS | HEART RATE: 82 BPM | RESPIRATION RATE: 19 BRPM | TEMPERATURE: 98 F

## 2021-07-11 DIAGNOSIS — K21.9 GASTRO-ESOPHAGEAL REFLUX DISEASE WITHOUT ESOPHAGITIS: ICD-10-CM

## 2021-07-11 DIAGNOSIS — R07.89 OTHER CHEST PAIN: ICD-10-CM

## 2021-07-11 DIAGNOSIS — F41.9 ANXIETY DISORDER, UNSPECIFIED: ICD-10-CM

## 2021-07-11 DIAGNOSIS — F32.9 MAJOR DEPRESSIVE DISORDER, SINGLE EPISODE, UNSPECIFIED: ICD-10-CM

## 2021-07-11 LAB
ALBUMIN SERPL ELPH-MCNC: 5 G/DL — SIGNIFICANT CHANGE UP (ref 3.3–5)
ALP SERPL-CCNC: 121 U/L — HIGH (ref 40–120)
ALT FLD-CCNC: 19 U/L — SIGNIFICANT CHANGE UP (ref 10–45)
ANION GAP SERPL CALC-SCNC: 14 MMOL/L — SIGNIFICANT CHANGE UP (ref 5–17)
AST SERPL-CCNC: 22 U/L — SIGNIFICANT CHANGE UP (ref 10–40)
BASOPHILS # BLD AUTO: 0.03 K/UL — SIGNIFICANT CHANGE UP (ref 0–0.2)
BASOPHILS NFR BLD AUTO: 0.5 % — SIGNIFICANT CHANGE UP (ref 0–2)
BILIRUB SERPL-MCNC: 0.5 MG/DL — SIGNIFICANT CHANGE UP (ref 0.2–1.2)
BUN SERPL-MCNC: 11 MG/DL — SIGNIFICANT CHANGE UP (ref 7–23)
CALCIUM SERPL-MCNC: 10 MG/DL — SIGNIFICANT CHANGE UP (ref 8.4–10.5)
CHLORIDE SERPL-SCNC: 106 MMOL/L — SIGNIFICANT CHANGE UP (ref 96–108)
CO2 SERPL-SCNC: 23 MMOL/L — SIGNIFICANT CHANGE UP (ref 22–31)
CREAT SERPL-MCNC: 0.6 MG/DL — SIGNIFICANT CHANGE UP (ref 0.5–1.3)
EOSINOPHIL # BLD AUTO: 0.09 K/UL — SIGNIFICANT CHANGE UP (ref 0–0.5)
EOSINOPHIL NFR BLD AUTO: 1.5 % — SIGNIFICANT CHANGE UP (ref 0–6)
GLUCOSE SERPL-MCNC: 113 MG/DL — HIGH (ref 70–99)
HCT VFR BLD CALC: 38.5 % — SIGNIFICANT CHANGE UP (ref 34.5–45)
HGB BLD-MCNC: 12.4 G/DL — SIGNIFICANT CHANGE UP (ref 11.5–15.5)
IMM GRANULOCYTES NFR BLD AUTO: 0.3 % — SIGNIFICANT CHANGE UP (ref 0–1.5)
LYMPHOCYTES # BLD AUTO: 2.05 K/UL — SIGNIFICANT CHANGE UP (ref 1–3.3)
LYMPHOCYTES # BLD AUTO: 33.8 % — SIGNIFICANT CHANGE UP (ref 13–44)
MCHC RBC-ENTMCNC: 22.9 PG — LOW (ref 27–34)
MCHC RBC-ENTMCNC: 32.2 GM/DL — SIGNIFICANT CHANGE UP (ref 32–36)
MCV RBC AUTO: 71.2 FL — LOW (ref 80–100)
MONOCYTES # BLD AUTO: 0.39 K/UL — SIGNIFICANT CHANGE UP (ref 0–0.9)
MONOCYTES NFR BLD AUTO: 6.4 % — SIGNIFICANT CHANGE UP (ref 2–14)
NEUTROPHILS # BLD AUTO: 3.48 K/UL — SIGNIFICANT CHANGE UP (ref 1.8–7.4)
NEUTROPHILS NFR BLD AUTO: 57.5 % — SIGNIFICANT CHANGE UP (ref 43–77)
NRBC # BLD: 0 /100 WBCS — SIGNIFICANT CHANGE UP (ref 0–0)
PLATELET # BLD AUTO: 339 K/UL — SIGNIFICANT CHANGE UP (ref 150–400)
POTASSIUM SERPL-MCNC: 3.4 MMOL/L — LOW (ref 3.5–5.3)
POTASSIUM SERPL-SCNC: 3.4 MMOL/L — LOW (ref 3.5–5.3)
PROT SERPL-MCNC: 7.7 G/DL — SIGNIFICANT CHANGE UP (ref 6–8.3)
RBC # BLD: 5.41 M/UL — HIGH (ref 3.8–5.2)
RBC # FLD: 15.9 % — HIGH (ref 10.3–14.5)
SODIUM SERPL-SCNC: 143 MMOL/L — SIGNIFICANT CHANGE UP (ref 135–145)
TROPONIN T SERPL-MCNC: 0.01 NG/ML — SIGNIFICANT CHANGE UP (ref 0–0.01)
WBC # BLD: 6.06 K/UL — SIGNIFICANT CHANGE UP (ref 3.8–10.5)
WBC # FLD AUTO: 6.06 K/UL — SIGNIFICANT CHANGE UP (ref 3.8–10.5)

## 2021-07-11 PROCEDURE — 99284 EMERGENCY DEPT VISIT MOD MDM: CPT

## 2021-07-11 PROCEDURE — 99283 EMERGENCY DEPT VISIT LOW MDM: CPT | Mod: 25

## 2021-07-11 PROCEDURE — 36415 COLL VENOUS BLD VENIPUNCTURE: CPT

## 2021-07-11 PROCEDURE — 84484 ASSAY OF TROPONIN QUANT: CPT

## 2021-07-11 PROCEDURE — 71045 X-RAY EXAM CHEST 1 VIEW: CPT

## 2021-07-11 PROCEDURE — 85025 COMPLETE CBC W/AUTO DIFF WBC: CPT

## 2021-07-11 PROCEDURE — 71045 X-RAY EXAM CHEST 1 VIEW: CPT | Mod: 26

## 2021-07-11 PROCEDURE — 80053 COMPREHEN METABOLIC PANEL: CPT

## 2021-07-11 NOTE — ED ADULT NURSE NOTE - OBJECTIVE STATEMENT
pt c/o feeling anxious today.   states she had anxiety in the past treated with ativan and over last few days has felt more anxious.   tonight while she was trying to relax and calm down she became sob.  she took a dose of ativan (.25?) from 2 years ago without relief.   arrives aaox3 with easy respirations.   calm and cooperative.

## 2021-07-11 NOTE — ED ADULT TRIAGE NOTE - OTHER COMPLAINTS
CC of anxiety x today accompanied by SOB, woke her up. took Clonazapam 0.25mg PO. Hx of anxiety CC of anxiety x today accompanied by SOB but denies any CP, woke her up. took Clonazapam 0.25mg PO. Hx of anxiety

## 2021-07-11 NOTE — ED ADULT NURSE NOTE - SUICIDE SCREENING QUESTION 2
No O-T Advancement Flap Text: The defect edges were debeveled with a #15 scalpel blade.  Given the location of the defect, shape of the defect and the proximity to free margins an O-T advancement flap was deemed most appropriate.  Using a sterile surgical marker, an appropriate advancement flap was drawn incorporating the defect and placing the expected incisions within the relaxed skin tension lines where possible.    The area thus outlined was incised deep to adipose tissue with a #15 scalpel blade.  The skin margins were undermined to an appropriate distance in all directions utilizing iris scissors.

## 2021-07-11 NOTE — ED PROVIDER NOTE - OBJECTIVE STATEMENT
57 y/o f hx anxiety presents c/o waking up this morning and feeling very anxious, had some sob and chest tightness.  Pt stating she took clonazepam and feels improved but not completely better and wants to make sure there isn't anything else that is causing her chest tightness.  Pt denies fever, chills, cough, n/v, all other ROS negative.

## 2021-07-11 NOTE — ED PROVIDER NOTE - PATIENT PORTAL LINK FT
You can access the FollowMyHealth Patient Portal offered by [facility] by registering at the following website: [email or web address]. By joining [employer]’s FollowMyHealth portal, you will also be able to view your health information using other applications (apps) compatible with our system.

## 2021-07-11 NOTE — ED ADULT NURSE NOTE - OTHER COMPLAINTS
CC of anxiety x today accompanied by SOB but denies any CP, woke her up. took Clonazapam 0.25mg PO. Hx of anxiety
Breath Sounds equal & clear to percussion & auscultation, no accessory muscle use

## 2021-09-08 ENCOUNTER — EMERGENCY (EMERGENCY)
Facility: HOSPITAL | Age: 56
LOS: 1 days | Discharge: HOME | End: 2021-09-08
Attending: EMERGENCY MEDICINE | Admitting: EMERGENCY MEDICINE
Payer: MEDICAID

## 2021-09-08 VITALS
WEIGHT: 117.95 LBS | HEIGHT: 62 IN | HEART RATE: 79 BPM | SYSTOLIC BLOOD PRESSURE: 148 MMHG | RESPIRATION RATE: 18 BRPM | TEMPERATURE: 98 F | DIASTOLIC BLOOD PRESSURE: 80 MMHG | OXYGEN SATURATION: 100 %

## 2021-09-08 VITALS
DIASTOLIC BLOOD PRESSURE: 59 MMHG | RESPIRATION RATE: 18 BRPM | SYSTOLIC BLOOD PRESSURE: 108 MMHG | OXYGEN SATURATION: 100 % | HEART RATE: 62 BPM | TEMPERATURE: 98 F

## 2021-09-08 DIAGNOSIS — F32.9 MAJOR DEPRESSIVE DISORDER, SINGLE EPISODE, UNSPECIFIED: ICD-10-CM

## 2021-09-08 DIAGNOSIS — K21.9 GASTRO-ESOPHAGEAL REFLUX DISEASE WITHOUT ESOPHAGITIS: ICD-10-CM

## 2021-09-08 DIAGNOSIS — H92.02 OTALGIA, LEFT EAR: ICD-10-CM

## 2021-09-08 DIAGNOSIS — R42 DIZZINESS AND GIDDINESS: ICD-10-CM

## 2021-09-08 DIAGNOSIS — Z86.69 PERSONAL HISTORY OF OTHER DISEASES OF THE NERVOUS SYSTEM AND SENSE ORGANS: ICD-10-CM

## 2021-09-08 DIAGNOSIS — R11.10 VOMITING, UNSPECIFIED: ICD-10-CM

## 2021-09-08 DIAGNOSIS — F41.9 ANXIETY DISORDER, UNSPECIFIED: ICD-10-CM

## 2021-09-08 LAB
ALBUMIN SERPL ELPH-MCNC: 4.6 G/DL — SIGNIFICANT CHANGE UP (ref 3.3–5)
ALP SERPL-CCNC: 98 U/L — SIGNIFICANT CHANGE UP (ref 40–120)
ALT FLD-CCNC: 13 U/L — SIGNIFICANT CHANGE UP (ref 10–45)
ANION GAP SERPL CALC-SCNC: 12 MMOL/L — SIGNIFICANT CHANGE UP (ref 5–17)
AST SERPL-CCNC: 26 U/L — SIGNIFICANT CHANGE UP (ref 10–40)
BASOPHILS # BLD AUTO: 0.03 K/UL — SIGNIFICANT CHANGE UP (ref 0–0.2)
BASOPHILS NFR BLD AUTO: 0.5 % — SIGNIFICANT CHANGE UP (ref 0–2)
BILIRUB SERPL-MCNC: 0.8 MG/DL — SIGNIFICANT CHANGE UP (ref 0.2–1.2)
BUN SERPL-MCNC: 11 MG/DL — SIGNIFICANT CHANGE UP (ref 7–23)
CALCIUM SERPL-MCNC: 9.4 MG/DL — SIGNIFICANT CHANGE UP (ref 8.4–10.5)
CHLORIDE SERPL-SCNC: 101 MMOL/L — SIGNIFICANT CHANGE UP (ref 96–108)
CO2 SERPL-SCNC: 24 MMOL/L — SIGNIFICANT CHANGE UP (ref 22–31)
CREAT SERPL-MCNC: 0.5 MG/DL — SIGNIFICANT CHANGE UP (ref 0.5–1.3)
EOSINOPHIL # BLD AUTO: 0.04 K/UL — SIGNIFICANT CHANGE UP (ref 0–0.5)
EOSINOPHIL NFR BLD AUTO: 0.7 % — SIGNIFICANT CHANGE UP (ref 0–6)
GLUCOSE SERPL-MCNC: 107 MG/DL — HIGH (ref 70–99)
HCT VFR BLD CALC: 34.1 % — LOW (ref 34.5–45)
HGB BLD-MCNC: 11.2 G/DL — LOW (ref 11.5–15.5)
IMM GRANULOCYTES NFR BLD AUTO: 0.7 % — SIGNIFICANT CHANGE UP (ref 0–1.5)
LYMPHOCYTES # BLD AUTO: 1.19 K/UL — SIGNIFICANT CHANGE UP (ref 1–3.3)
LYMPHOCYTES # BLD AUTO: 20.5 % — SIGNIFICANT CHANGE UP (ref 13–44)
MCHC RBC-ENTMCNC: 23.2 PG — LOW (ref 27–34)
MCHC RBC-ENTMCNC: 32.8 GM/DL — SIGNIFICANT CHANGE UP (ref 32–36)
MCV RBC AUTO: 70.6 FL — LOW (ref 80–100)
MONOCYTES # BLD AUTO: 0.24 K/UL — SIGNIFICANT CHANGE UP (ref 0–0.9)
MONOCYTES NFR BLD AUTO: 4.1 % — SIGNIFICANT CHANGE UP (ref 2–14)
NEUTROPHILS # BLD AUTO: 4.27 K/UL — SIGNIFICANT CHANGE UP (ref 1.8–7.4)
NEUTROPHILS NFR BLD AUTO: 73.5 % — SIGNIFICANT CHANGE UP (ref 43–77)
NRBC # BLD: 0 /100 WBCS — SIGNIFICANT CHANGE UP (ref 0–0)
PLATELET # BLD AUTO: 302 K/UL — SIGNIFICANT CHANGE UP (ref 150–400)
POTASSIUM SERPL-MCNC: 4 MMOL/L — SIGNIFICANT CHANGE UP (ref 3.5–5.3)
POTASSIUM SERPL-SCNC: 4 MMOL/L — SIGNIFICANT CHANGE UP (ref 3.5–5.3)
PROT SERPL-MCNC: 7.3 G/DL — SIGNIFICANT CHANGE UP (ref 6–8.3)
RBC # BLD: 4.83 M/UL — SIGNIFICANT CHANGE UP (ref 3.8–5.2)
RBC # FLD: 16 % — HIGH (ref 10.3–14.5)
SODIUM SERPL-SCNC: 137 MMOL/L — SIGNIFICANT CHANGE UP (ref 135–145)
WBC # BLD: 5.81 K/UL — SIGNIFICANT CHANGE UP (ref 3.8–10.5)
WBC # FLD AUTO: 5.81 K/UL — SIGNIFICANT CHANGE UP (ref 3.8–10.5)

## 2021-09-08 PROCEDURE — 36415 COLL VENOUS BLD VENIPUNCTURE: CPT

## 2021-09-08 PROCEDURE — 99284 EMERGENCY DEPT VISIT MOD MDM: CPT

## 2021-09-08 PROCEDURE — 96361 HYDRATE IV INFUSION ADD-ON: CPT

## 2021-09-08 PROCEDURE — 85025 COMPLETE CBC W/AUTO DIFF WBC: CPT

## 2021-09-08 PROCEDURE — 96374 THER/PROPH/DIAG INJ IV PUSH: CPT

## 2021-09-08 PROCEDURE — 80053 COMPREHEN METABOLIC PANEL: CPT

## 2021-09-08 PROCEDURE — 82962 GLUCOSE BLOOD TEST: CPT

## 2021-09-08 PROCEDURE — 93010 ELECTROCARDIOGRAM REPORT: CPT

## 2021-09-08 PROCEDURE — 99284 EMERGENCY DEPT VISIT MOD MDM: CPT | Mod: 25

## 2021-09-08 PROCEDURE — 93005 ELECTROCARDIOGRAM TRACING: CPT

## 2021-09-08 RX ORDER — MECLIZINE HCL 12.5 MG
25 TABLET ORAL ONCE
Refills: 0 | Status: COMPLETED | OUTPATIENT
Start: 2021-09-08 | End: 2021-09-08

## 2021-09-08 RX ORDER — MECLIZINE HCL 12.5 MG
1 TABLET ORAL
Qty: 30 | Refills: 0
Start: 2021-09-08

## 2021-09-08 RX ORDER — ONDANSETRON 8 MG/1
1 TABLET, FILM COATED ORAL
Qty: 9 | Refills: 0
Start: 2021-09-08 | End: 2021-09-10

## 2021-09-08 RX ORDER — ONDANSETRON 8 MG/1
4 TABLET, FILM COATED ORAL ONCE
Refills: 0 | Status: COMPLETED | OUTPATIENT
Start: 2021-09-08 | End: 2021-09-08

## 2021-09-08 RX ORDER — SODIUM CHLORIDE 9 MG/ML
1000 INJECTION INTRAMUSCULAR; INTRAVENOUS; SUBCUTANEOUS ONCE
Refills: 0 | Status: COMPLETED | OUTPATIENT
Start: 2021-09-08 | End: 2021-09-08

## 2021-09-08 RX ADMIN — ONDANSETRON 4 MILLIGRAM(S): 8 TABLET, FILM COATED ORAL at 16:53

## 2021-09-08 RX ADMIN — Medication 25 MILLIGRAM(S): at 16:53

## 2021-09-08 RX ADMIN — Medication 25 MILLIGRAM(S): at 18:17

## 2021-09-08 RX ADMIN — SODIUM CHLORIDE 1000 MILLILITER(S): 9 INJECTION INTRAMUSCULAR; INTRAVENOUS; SUBCUTANEOUS at 18:19

## 2021-09-08 RX ADMIN — SODIUM CHLORIDE 2000 MILLILITER(S): 9 INJECTION INTRAMUSCULAR; INTRAVENOUS; SUBCUTANEOUS at 16:54

## 2021-09-08 NOTE — ED PROVIDER NOTE - OBJECTIVE STATEMENT
57 y/o F PMHx Vertigo related to ear infection 2 years ago, GERD, anxiety, and depression. Presents to ED for recurrent vertigo symptoms this past week and right ear pain. Pt does not note any ringing or fevers. States that vertigo feels exactly like when she had it in the past, but worse. She is unable to take any medications today because of vomiting. Pt is here in ED for evaluation of her symptoms. Denies double vision, difficulty speaking and swallowing, and gait instability. She describes her dizziness as room spinning and is worse with head movement. Denies chest pain and SOB. Pt now feels lightheaded as she is unable to tolerate PO.

## 2021-09-08 NOTE — ED PROVIDER NOTE - CLINICAL SUMMARY MEDICAL DECISION MAKING FREE TEXT BOX
57 y/o F PMHx vertigo related to ear infection, GERD, anxiety, depression. Presents with vertigo and right ear pain. Pt's symptoms are likely peripheral vertigo given Hx in past and concomitant ear pain.   Plan for IV hydration, IV antiemetics, and oral Meclizine. Doubt stroke as source of symptoms.

## 2021-09-08 NOTE — ED ADULT TRIAGE NOTE - CHIEF COMPLAINT QUOTE
Pt brought in by EMS for vertigo episode. Pt states she has had intermittent dizziness for the past week, worse today. Pt states "I feel like everything is moving." Denies chest pain, shortness of breath, fevers. Reports associated vomiting. Neuro intact.

## 2021-09-08 NOTE — ED PROVIDER NOTE - PATIENT PORTAL LINK FT
You can access the FollowMyHealth Patient Portal offered by [facility] by registering at the following website: [email or web address]. By joining [employer]’s FollowMyHealth portal, you will also be able to view your health information using other applications (apps) compatible with our system.

## 2021-09-08 NOTE — ED PROVIDER NOTE - PHYSICAL EXAMINATION
CONSTITUTIONAL: middle aged woman in moderate distress, laying in stretcher with blanket over eyes.   HEAD: Normocephalic; atraumatic.   EYES:  conjunctiva and sclera clear  ENT: normal nose; no rhinorrhea; normal pharynx with no erythema or lesions. Dry mucous membranes.  NECK: Supple; non-tender;   CARDIOVASCULAR: Normal S1, S2; no murmurs, rubs, or gallops. Regular rate and rhythm.   RESPIRATORY: Breathing easily; breath sounds clear and equal bilaterally; no wheezes, rhonchi, or rales.  GI: Soft; non-distended; non-tender; no palpable organomegaly.   EXT: No cyanosis or edema; N/V intact  SKIN: Normal for age and race; warm; dry; good turgor; no apparent lesions or rash.   NEURO: A & O x 3; face symmetric; grossly unremarkable.   PSYCHOLOGICAL: The patient’s mood and manner are appropriate.

## 2021-09-08 NOTE — ED PROVIDER NOTE - CHPI ED SYMPTOMS NEG
no double vision, no difficulties speaking or swallowing, no gait instability, no chest pain, no SOB

## 2021-09-21 NOTE — ED PROVIDER NOTE - MEDICAL DECISION MAKING DETAILS
Problem List Complete:  Yes    amphetamine-dextroamphetamine (ADDERALL XR) 20 MG 24 hr capsule 30 capsule 0 7/29/2021 8/28/2021 --   Sig - Route: Take 1 capsule (20 mg) by mouth daily - Oral   Sent to pharmacy as: Amphetamine-Dextroamphet ER 20 MG Oral Capsule Extended Release 24 Hour (Adderall XR)   Class: E-Prescribe         THE MOST RECENT OFFICE VISIT MUST BE WITHIN THE PAST 3 MONTHS. AT LEAST ONE FACE TO FACE VISIT MUST OCCUR EVERY 6 MONTHS. ADDITIONAL VISITS CAN BE VIRTUAL.  (THIS STATEMENT SHOULD BE DELETED.)    [facility] care provider: 5/28/2021      Future Office visit:     Controlled substance agreement:   Encounter-Level CSA:    There are no encounter-level csa.       Patient-Level CSA:    Controlled Substance Agreement - Non - Opioid - Scan on 12/16/2020  6:41 AM: NON-OPIOID CONTROLLED SUBSTANCE AGREEMENT         Last Urine Drug Screen: No results found for: CDAUT, No results found for: COMDAT, No results found for: THC13, PCP13, COC13, MAMP13, OPI13, AMP13, BZO13, TCA13, MTD13, BAR13, OXY13, PPX13, BUP13     Processing:  Rx to be electronically transmitted to pharmacy by provider     [email or web address]   checked in past 3 months?  No, route to RN        .tsb  
Due for office visit.      Can do preventative visit/fasting labs and ADHD follow-up.     Refill completed.   - Sandra, CNP  
Physical scheduled for 10/29/21.   Sudeep Russo      
pt w/ R jaw pain x few mon no acute changes in pain today, seen by dentist and dx'd as TMJ but has not taken the nsaids as advised, had long discussion about symptom control and to take nsaids, to avoid crunchy and chewy foods, to f/u w/her dentist and already has omfs f/u in place (understands to keep the appointment)

## 2021-09-27 NOTE — ED ADULT NURSE NOTE - NS PRO PASSIVE SMOKE EXP
COVID-19 Week 2 Survey      Responses   [facility] patient discharged from?  [facility]   Does the patient have one of the following disease processes/diagnoses(primary or secondary)?  COVID-19   COVID-19 underlying condition?  None   Call Number  Call 1   COVID-19 Week 2: Call 1 attempt successful?  Yes   Call start time  1714   Call end time  1718   Discharge diagnosis  Acute upper GI bleeding,    Covid positive   Meds reviewed with patient/caregiver?  Yes   Is the patient having any side effects they believe may be caused by any medication additions or changes?  No   Does the patient have all medications ordered at discharge?  Yes   Is the patient taking all medications as directed (includes completed medication regime)?  Yes   Does the patient have a primary care provider?   Yes   Does the patient have an appointment with their PCP or specialist within 7 days of discharge?  No   What is preventing the patient from scheduling follow up appointments within 7 days of discharge?  Haven't had time, Uncomfortable attending in person appointment   Nursing Interventions  Advised patient to make appointment   Has the patient kept scheduled appointments due by today?  N/A   Has home health visited the patient within 72 hours of discharge?  N/A   Psychosocial issues?  No   Comments  pt states free of COVID symptoms, vomiting bleeding, states will be seeing MD for DM consultation and education, and will be started on insulin   Did the patient receive a copy of their discharge instructions?  Yes   Did the patient receive a copy of COVID-19 specific instructions?  Yes   Nursing interventions  Reviewed instructions with patient   What is the patient's perception of their health status since discharge?  Improving   Does the patient have any of the following symptoms?  None   Nursing Interventions  Nurse provided patient education   Pulse Ox monitoring  None   Is the patient/caregiver able to teach back steps to recovery at  home?  Set small, achievable goals for return to baseline health, Rest and rebuild strength, gradually increase activity, Eat a well-balance diet   Is the patient/caregiver able to teach back the hierarchy of who to call/visit for symptoms/problems? PCP, Specialist, Home health nurse, Urgent Care, ED, 911  Yes          Julisa Neely RN   No

## 2021-11-04 ENCOUNTER — TRANSCRIPTION ENCOUNTER (OUTPATIENT)
Age: 56
End: 2021-11-04

## 2022-01-10 NOTE — BEHAVIORAL HEALTH ASSESSMENT NOTE - NSBHCONSULTFOLLOWAFTERCARE_PSY_A_CORE FT
No 1)Would start lexapro 5 mg daily and klonopin 0.5 mg q12h for panic and anxiety with the object of tapering off klonopin once the lexapro starts working in 2-4 weeks.     2)Refer to [facility], [facility] outpatient psychiatric clinic- [phone], [address]     3)Continue weekly individual therapy.

## 2022-03-03 PROBLEM — R42 DIZZINESS AND GIDDINESS: Chronic | Status: ACTIVE | Noted: 2021-09-08

## 2022-03-11 ENCOUNTER — APPOINTMENT (OUTPATIENT)
Dept: ULTRASOUND IMAGING | Facility: HOSPITAL | Age: 57
End: 2022-03-11

## 2022-03-11 ENCOUNTER — APPOINTMENT (OUTPATIENT)
Dept: MAMMOGRAPHY | Facility: HOSPITAL | Age: 57
End: 2022-03-11

## 2022-03-11 NOTE — BEHAVIORAL HEALTH ASSESSMENT NOTE - MEDICAL RECORD REVIEWED
Recommendations from today's MTM visit:                                                       OK to stop taking magnesium    Follow-up: medication review in 3 months or sooner as needed    It was great to speak with you today.  I value your experience and would be very thankful for your time with providing feedback on our clinic survey. You may receive a survey via email or text message in the next few days.     To schedule another MTM appointment, please call the clinic directly or you may call the MTM scheduling line at [phone] or toll-free at [phone].     My Clinical Pharmacist's contact information:                                                      Please feel free to contact me with any questions or concerns you have.      Nieves Simmons, PharmD, BCACP   Medication Management Pharmacist   [facility]  [phone]      Yes

## 2022-03-16 ENCOUNTER — APPOINTMENT (OUTPATIENT)
Dept: MRI IMAGING | Facility: HOSPITAL | Age: 57
End: 2022-03-16

## 2022-03-28 ENCOUNTER — TRANSCRIPTION ENCOUNTER (OUTPATIENT)
Age: 57
End: 2022-03-28

## 2022-04-12 ENCOUNTER — OUTPATIENT (OUTPATIENT)
Dept: OUTPATIENT SERVICES | Facility: HOSPITAL | Age: 57
LOS: 1 days | End: 2022-04-12
Payer: COMMERCIAL

## 2022-04-12 ENCOUNTER — APPOINTMENT (OUTPATIENT)
Dept: ULTRASOUND IMAGING | Facility: HOSPITAL | Age: 57
End: 2022-04-12

## 2022-04-12 ENCOUNTER — APPOINTMENT (OUTPATIENT)
Dept: MAMMOGRAPHY | Facility: HOSPITAL | Age: 57
End: 2022-04-12
Payer: SELF-PAY

## 2022-04-12 PROCEDURE — 77066 DX MAMMO INCL CAD BI: CPT

## 2022-04-12 PROCEDURE — G0279: CPT | Mod: 26

## 2022-04-12 PROCEDURE — 77066 DX MAMMO INCL CAD BI: CPT | Mod: 26

## 2022-04-12 PROCEDURE — G0279: CPT

## 2022-07-01 NOTE — ED PROVIDER NOTE - CLINICAL SUMMARY MEDICAL DECISION MAKING FREE TEXT BOX
Pt sent a message in my chart about having poison would like to know if something could be sent to the pharmacy for her she has tried over the counter stuff and it isn't helping   please advise pt 55 y/o f hx anxiety presents c/o waking up this morning feeling anxious and with chest tightness; sx significantly improved in ED although pt still admits to feeling anxious, wants to be worked up, denies SI/HI.  Labs and EKG neg, trop neg, cxr neg.  Pt offered anxiolytic in ED and started crying saying she didn't want to get addicted she just wants to make sure shes ok.  W/u in ED nonrevealing, no indication for inpatient w/u, sx more likely related to anxiety although given pt's age, recommended to f/u with cardiologist for stress test.

## 2022-07-13 NOTE — ED ADULT NURSE NOTE - PRO INTERPRETER NEED 2
Detail Level: Detailed Add 14509 Cpt? (Important Note: In 2017 The Use Of 32510 Is Being Tracked By Cms To Determine Future Global Period Reimbursement For Global Periods): no English

## 2022-07-25 NOTE — ED PROVIDER NOTE - MEDICAL DECISION MAKING DETAILS
Body Location Override (Optional - Billing Will Still Be Based On Selected Body Map Location If Applicable): left cheek Add 15056 Cpt? (Important Note: In 2017 The Use Of 41737 Is Being Tracked By Cms To Determine Future Global Period Reimbursement For Global Periods): yes Detail Level: Detailed patient presents to ED w concern for dysphagia over the past several days.  Given PPI type medication by PCP which does not seem to be helping.  Patient tolerating sips of water in ED without emesis.  Patient given jello and stating it is causing her pain and "getting stuck."  No emesis, no signs of resp distress.  CXR unremarkable.  EKG and labs reviewed.  All results discussed w patient and family at bedside.  Patient refusing discharge w OP GI f/u as she is stating she cannot tolerate solid food intake.  I spoke with GI on call, who recommends admission and is agreeable to esophagram and further evaluation in am.  Patient and family at bedside aware of plan and agreeable.  Will admit at this time.

## 2022-08-06 ENCOUNTER — EMERGENCY (EMERGENCY)
Facility: HOSPITAL | Age: 57
LOS: 1 days | Discharge: HOME | End: 2022-08-06
Attending: EMERGENCY MEDICINE | Admitting: EMERGENCY MEDICINE
Payer: COMMERCIAL

## 2022-08-06 VITALS
OXYGEN SATURATION: 96 % | SYSTOLIC BLOOD PRESSURE: 111 MMHG | RESPIRATION RATE: 15 BRPM | WEIGHT: 125 LBS | DIASTOLIC BLOOD PRESSURE: 56 MMHG | TEMPERATURE: 98 F | HEIGHT: 62 IN | HEART RATE: 57 BPM

## 2022-08-06 PROCEDURE — 99283 EMERGENCY DEPT VISIT LOW MDM: CPT

## 2022-08-06 PROCEDURE — 99282 EMERGENCY DEPT VISIT SF MDM: CPT

## 2022-08-06 NOTE — ED PROVIDER NOTE - NSFOLLOWUPINSTRUCTIONS_ED_ALL_ED_FT
Apply hydrocortisone cream to rash.  Follow up with a dermatologist/ allergist for re-evaluation and allergy testing.  Return to er for any new or worsening symptoms (worsening rash, fever, chills, bodyaches, weakness, fatigue...).          EnglishSpanish                                                                                                                Rash, Adult       A rash is a change in the color of your skin. A rash can also change the way your skin feels. There are many different conditions and factors that can cause a rash.      Follow these instructions at home:    The goal of treatment is to stop the itching and keep the rash from spreading. Watch for any changes in your symptoms. Let your doctor know about them. Follow these instructions to help with your condition:      Medicine      Take or apply over-the-counter and prescription medicines only as told by your doctor. These may include medicines:  •To treat red or swollen skin (corticosteroid creams).      •To treat itching.      •To treat an allergy (oral antihistamines).      •To treat very bad symptoms (oral corticosteroids).      Skin care     •Put cool cloths (compresses) on the affected areas.      • Do not scratch or rub your skin.      •Avoid covering the rash. Make sure that the rash is exposed to air as much as possible.      Managing itching and discomfort     •Avoid hot showers or baths. These can make itching worse. A cold shower may help.    •Try taking a bath with:  •Epsom salts. You can get these at your local pharmacy or grocery store. Follow the instructions on the package.      •Baking soda. Pour a small amount into the bath as told by your doctor.      •Colloidal oatmeal. You can get this at your local pharmacy or grocery store. Follow the instructions on the package.        •Try putting baking soda paste onto your skin. Stir water into baking soda until it gets like a paste.      •Try putting on a lotion that relieves itchiness (calamine lotion).      •Keep cool and out of the sun. Sweating and being hot can make itching worse.        General instructions      •Rest as needed.      •Drink enough fluid to keep your pee (urine) pale yellow.      •Wear loose-fitting clothing.      •Avoid scented soaps, detergents, and perfumes. Use gentle soaps, detergents, perfumes, and other cosmetic products.    •Avoid anything that causes your rash. Keep a journal to help track what causes your rash. Write down:  •What you eat.      •What cosmetic products you use.      •What you drink.      •What you wear. This includes jewelry.        •Keep all follow-up visits as told by your doctor. This is important.        Contact a doctor if:    •You sweat at night.      •You lose weight.      •You pee (urinate) more than normal.      •You pee less than normal, or you notice that your pee is a darker color than normal.      •You feel weak.      •You throw up (vomit).      •Your skin or the whites of your eyes look yellow (jaundice).    •Your skin:  •Tingles.      •Is numb.      •Your rash:  •Does not go away after a few days.      •Gets worse.      •You are:  •More thirsty than normal.      •More tired than normal.      •You have:  •New symptoms.      •Pain in your belly (abdomen).      •A fever.      •Watery poop (diarrhea).          Get help right away if:    •You have a fever and your symptoms suddenly get worse.      •You start to feel mixed up (confused).      •You have a very bad headache or a stiff neck.      •You have very bad joint pains or stiffness.      •You have jerky movements that you cannot control (seizure).      •Your rash covers all or most of your body. The rash may or may not be painful.    •You have blisters that:  •Are on top of the rash.      •Grow larger.      •Grow together.      •Are painful.      •Are inside your nose or mouth.      •You have a rash that:  •Looks like purple pinprick-sized spots all over your body.      •Has a "bull's eye" or looks like a target.      •Is red and painful, causes your skin to peel, and is not from being in the sun too long.          Summary    •A rash is a change in the color of your skin. A rash can also change the way your skin feels.      •The goal of treatment is to stop the itching and keep the rash from spreading.      •Take or apply over-the-counter and prescription medicines only as told by your doctor.      •Contact a doctor if you have new symptoms or symptoms that get worse.      •Keep all follow-up visits as told by your doctor. This is important.      This information is not intended to replace advice given to you by your health care provider. Make sure you discuss any questions you have with your health care provider.      Document Revised: 04/10/2020 Document Reviewed: 07/22/2019    Elsevier Patient Education © 2022 [employer]

## 2022-08-06 NOTE — ED PROVIDER NOTE - PATIENT PORTAL LINK FT
You can access the FollowMyHealth Patient Portal offered by [facility] by registering at the following website: [email or web address]. By joining [employer]’s FollowMyHealth portal, you will also be able to view your health information using other applications (apps) compatible with our system.

## 2022-08-06 NOTE — ED PROVIDER NOTE - OBJECTIVE STATEMENT
58 y/o F with a PMHX of depression, anxiety, vertigo and reflux esophagitis presents to the ED c/o rash to her left inner thigh since last night. States there is no itching or pain to the area and no drainage from the rash. Pt denies any fevers, chills, URI symptoms, swelling to lymph nodes, myalgia, CP, SOB, abdominal pain, nausea, vomiting, or diarrhaea. Pt states that she had similar type of rash on and off for many years usually is related to when she sleeps on new bedding. Pt admits to getting a new mattress this past week and is not sure if this is related. Pts daughter is sick with flu like symptoms but has no rash however. 56 y/o F with a PMHX of depression, anxiety, vertigo and reflux esophagitis presents to the ED c/o rash to her left inner thigh since last night. States there is no itching or pain to the area and no drainage from the rash. Pt denies any fevers, chills, URI symptoms, swelling to lymph nodes, myalgia, CP, SOB, abdominal pain, nausea, vomiting, or diarrhaea. Pt states that she has had similar type of rash on and off for many years usually is related to when she sleeps on new bedding. Pt admits to getting a new mattress this past week and is not sure if this is related. Pts daughter is sick with flu like symptoms but has no rash however.

## 2022-08-06 NOTE — ED ADULT NURSE NOTE - OBJECTIVE STATEMENT
Patient a+o x4 c/o rash to inner left thigh x 1 days, states has occurred previously with new bedding and resolved on its own. Maintaining patent airway, comfortable-appearing. Denies sob/cp/dizziness/n/v/fever/chills. Discharge instructions given and reviewed.

## 2022-08-06 NOTE — ED ADULT TRIAGE NOTE - OTHER COMPLAINTS
left inner thigh rash since last night, no itchiness, no pain, no fevers, no sore throat. reports had similar episode in past

## 2022-08-06 NOTE — ED PROVIDER NOTE - PHYSICAL EXAMINATION
VITAL SIGNS: I have reviewed nursing notes and confirm.  CONSTITUTIONAL: Well appearing, in no acute distress.   SKIN:  warm and dry, no acute rash.   HEAD:  normocephalic, atraumatic.  EYES: EOM intact; conjunctiva and sclera clear.  ENT: No nasal discharge; airway clear.   NECK: Supple.  EXT: Normal ROM. No peripheral edema. Pulses intact and equal b/l. + scattered erythematous papules to posterior and medial aspect of LLE, non-blanching, non-vesicular, no pustules, does not follow dermatome. 1-2 papules also noted to r lower leg.   NEURO: Alert, oriented, grossly unremarkable  PSYCH: Cooperative, mood and affect appropriate.

## 2022-08-06 NOTE — ED PROVIDER NOTE - CLINICAL SUMMARY MEDICAL DECISION MAKING FREE TEXT BOX
Impression:   58 y/o F here with acute rash to left medial thigh since last night. Pt has a hx of similar types of rashes over the years related to sleeping on new bedding. Pt admits to sleeping on new mattress this past week. Pt denies any constitutional symptoms. Pt is well appearing. Pt is afebrile and hemodynamically stable. Impression:   58 y/o F here with acute rash to left leg since last night. Pt has a hx of similar types of rashes over the years related to sleeping on new bedding. Pt admits to sleeping on new mattress this past week. Pt denies any constitutional symptoms. Pt is well appearing. Pt is afebrile and hemodynamically stable. Rash likely allergic in etiology. Do not suspect mpx, viral exanthem, shingles, cellulitis. ED evaluation and management discussed with the patient and daughter in detail.  Hydrocortisone cream and close allergy/ derm follow up encouraged.  Strict ED return instructions discussed in detail and patient given the opportunity to ask any questions about their discharge diagnosis and instructions. Patient verbalized understanding.

## 2022-08-08 DIAGNOSIS — R42 DIZZINESS AND GIDDINESS: ICD-10-CM

## 2022-08-08 DIAGNOSIS — R21 RASH AND OTHER NONSPECIFIC SKIN ERUPTION: ICD-10-CM

## 2022-08-08 DIAGNOSIS — F32.A DEPRESSION, UNSPECIFIED: ICD-10-CM

## 2022-08-08 DIAGNOSIS — K21.00 GASTRO-ESOPHAGEAL REFLUX DISEASE WITH ESOPHAGITIS, WITHOUT BLEEDING: ICD-10-CM

## 2022-08-08 DIAGNOSIS — F41.9 ANXIETY DISORDER, UNSPECIFIED: ICD-10-CM

## 2022-10-13 NOTE — ED ADULT NURSE NOTE - MODE OF DISCHARGE
Chronic condition stable  - Continue Xarelto 20 mg daily, refill sent to pharmacy on file  - Referral to pharmacotherapy services for anticoagulation management   Ambulatory

## 2022-12-19 ENCOUNTER — NON-APPOINTMENT (OUTPATIENT)
Age: 57
End: 2022-12-19

## 2023-05-15 NOTE — ED ADULT NURSE NOTE - BREATHING, MLM
05/15/2023  Maxime Masterson is a 58 y.o., male.      Pre-op Assessment    I have reviewed the Patient Summary Reports.     I have reviewed the Nursing Notes. I have reviewed the NPO Status.   I have reviewed the Medications.     Review of Systems  Anesthesia Hx:  No problems with previous Anesthesia    Social:  Non-Smoker    Hematology/Oncology:         -- Anemia:   Cardiovascular:  Cardiovascular Normal     Pulmonary:  Pulmonary Normal    Renal/:  Renal/ Normal     Hepatic/GI:   GERD, well controlled    Neurological:  Neurology Normal    Endocrine:  Endocrine Normal        Physical Exam  General: Well nourished, Cooperative, Alert and Oriented    Airway:  Mallampati: II   Mouth Opening: Normal  TM Distance: Normal  Neck ROM: Normal ROM        Anesthesia Plan  Type of Anesthesia, risks & benefits discussed:    Anesthesia Type: Gen ETT, Gen Supraglottic Airway, Gen Natural Airway, MAC  Intra-op Monitoring Plan: Standard ASA Monitors  Post Op Pain Control Plan: multimodal analgesia  Induction:  IV  Airway Plan: Direct, Video and Fiberoptic, Post-Induction  Informed Consent: Informed consent signed with the Patient and all parties understand the risks and agree with anesthesia plan.  All questions answered.   ASA Score: 2    Ready For Surgery From Anesthesia Perspective.     .       Spontaneous, unlabored and symmetrical

## 2023-05-31 ENCOUNTER — APPOINTMENT (OUTPATIENT)
Dept: OBGYN | Facility: CLINIC | Age: 58
End: 2023-05-31

## 2023-06-09 ENCOUNTER — OUTPATIENT (OUTPATIENT)
Dept: OUTPATIENT SERVICES | Facility: HOSPITAL | Age: 58
LOS: 1 days | End: 2023-06-09
Payer: COMMERCIAL

## 2023-06-09 ENCOUNTER — APPOINTMENT (OUTPATIENT)
Dept: MAMMOGRAPHY | Facility: HOSPITAL | Age: 58
End: 2023-06-09

## 2023-06-09 PROCEDURE — 77067 SCR MAMMO BI INCL CAD: CPT | Mod: 26

## 2023-06-09 PROCEDURE — 77067 SCR MAMMO BI INCL CAD: CPT

## 2023-06-09 PROCEDURE — 77063 BREAST TOMOSYNTHESIS BI: CPT

## 2023-06-09 PROCEDURE — 77063 BREAST TOMOSYNTHESIS BI: CPT | Mod: 26

## 2023-06-17 NOTE — ED ADULT NURSE NOTE - CADM POA CENTRAL LINE
OFFICE VISIT  Patient: La Sam   : 2004 MRN: [id number]  SUBJECTIVE:        Chief Complaint   Patient presents with   • Establish Care       This 19 year old female is here to establish care.     Patient has given consent to record this visit for documentation in their clinical record.     HISTORY OF PRESENT ILLNESS:    No prior PCP.  GYN/OBS: Mentions that she got diagnosed with chlamydia 2 weeks ago. She got treated for the same. Has regular menstrual cycle lasting for 5 days, Patient gets nausea and fatigue prior and during periods. Attained menarche at age 9/10.  She followed up with her O/Gyn, recently.  Previous labs reported normal CBC and thyroid function.   Reports right shoulder sprain on and off; however, is better now.   Patient is sexually active. Denies using IUD in the past. Works at [employer].   Informs that she has not been able to eat eggs or milk for past 8 months. Eats timely meals and stays hydrated.      DANIEL (generalized anxiety disorder)/Mixed anxiety and depressive disorder/Dyslexia:  Patient is diagnosed with depression, anxiety and dyslexia in 2022. Underwent neuropsychology assessment on 2022, and was diagnosed with language processing disorder with embedded reading disorder, dyslexia, and mixed, depressive anxiety disorder. Currently, not following with any therapy as she cannot afford it. Informs getting worried while driving, or at work if anybody is watching her. Gets anxious in social situations, which is improving over the last 3 months. She usually avoids interacting with people.      PAST MEDICAL HISTORY:  Past Medical History:   Diagnosis Date   • Bilateral otitis media 2017   • Chlamydia    • Gastroenteritis    • Noninfectious ileitis      MEDICATIONS:  Current Outpatient Medications   Medication Sig   • sertraline (ZOLOFT) 50 MG tablet Start half tablet orally once daily for 6 days, then take 1 tablet once daily.     No current  facility-administered medications for this visit.     ALLERGIES:  ALLERGIES:   Allergen Reactions   • Tuna   (Food) DIARRHEA   • Lavender Oil SWELLING     Itchy/watery eyes  Runny nose   • Lemon   (Food Or Med) Other (See Comments)     Scratchy throat   • Mixed Feathers HIVES   • Nuts   (Food) HIVES   • Peanut   (Food Or Med) HIVES     PAST SURGICAL HISTORY:  Past Surgical History:   Procedure Laterality Date   • Appendectomy  11/08/2020   • Remove tonsils/adenoids,<12 y/o       FAMILY HISTORY:  Family History   Problem Relation Age of Onset   • Cancer, Lung Paternal Grandmother      SOCIAL HISTORY:  Social History     Tobacco Use   Smoking Status Never   Smokeless Tobacco Never     Social History     Substance and Sexual Activity   Alcohol Use Never         Review of Systems    As per HPI.    Recent PHQ 2/9 Score    PHQ 2:  Date Adult PHQ 2 Score Adult PHQ 2 Interpretation   6/16/2023 4 Further screening needed       PHQ 9:  Date Adult PHQ 9 Score Adult PHQ 9 Interpretation   6/16/2023 14 Moderate Depression     Most Recent DANIEL 7 Score       Date DANIEL 7 Score   6/16/2023 16           OBJECTIVE:  Vitals:    06/16/23 1521   BP: 104/64   BP Location: LUE - Left upper extremity   Patient Position: Sitting   Cuff Size: Regular   Pulse: 76   Resp: 16   SpO2: 100%   Weight: 67 kg (147 lb 9.6 oz)   Height: 5' 2\"   PainSc:  0   LMP: 06/04/2023     Body mass index is 27 kg/m².    Physical Exam    [Const]: Alert. Well-developed and well-nourished.   [Resp]: No apparent respiratory distress.   [Extremities]: Warm and well-perfused.   [Skin]: No visible rashes or lesions.   [Psych]: Appropriate mood and affect.      DIAGNOSTIC STUDIES:  LAB RESULTS:  Lab Services on 05/31/2023   Component Date Value Ref Range Status   • TSH 05/31/2023 1.173  0.463 - 4.130 mcUnits/mL Final   • T4, Free 05/31/2023 1.1  0.8 - 1.3 ng/dL Final   • Fasting Status 05/31/2023 0  0 - 999 Hours Final   • Sodium 05/31/2023 141  135 - 145 mmol/L Final   •  Potassium 05/31/2023 4.1  3.4 - 5.1 mmol/L Final   • Chloride 05/31/2023 106  97 - 110 mmol/L Final   • Carbon Dioxide 05/31/2023 26  21 - 32 mmol/L Final   • Anion Gap 05/31/2023 13  7 - 19 mmol/L Final   • Glucose 05/31/2023 91  70 - 99 mg/dL Final   • BUN 05/31/2023 15  6 - 20 mg/dL Final   • Creatinine 05/31/2023 0.81  0.51 - 0.95 mg/dL Final   • Glomerular Filtration Rate 05/31/2023 >90  >=60 Final   • BUN/Cr 05/31/2023 19  7 - 25 Final   • Calcium 05/31/2023 9.0  8.4 - 10.2 mg/dL Final   • Bilirubin, Total 05/31/2023 0.3  0.2 - 1.0 mg/dL Final   • GOT/AST 05/31/2023 24  <=37 Units/L Final   • GPT/ALT 05/31/2023 25  <64 Units/L Final   • Alkaline Phosphatase 05/31/2023 109  45 - 117 Units/L Final   • Albumin 05/31/2023 4.2  3.6 - 5.1 g/dL Final   • Protein, Total 05/31/2023 7.1  6.4 - 8.2 g/dL Final   • Globulin 05/31/2023 2.9  2.0 - 4.0 g/dL Final   • A/G Ratio 05/31/2023 1.4  1.0 - 2.4 Final   • COLOR, URINALYSIS 05/31/2023 Yellow   Final   • APPEARANCE, URINALYSIS 05/31/2023 Hazy   Final   • GLUCOSE, URINALYSIS 05/31/2023 Negative  Negative mg/dL Final   • BILIRUBIN, URINALYSIS 05/31/2023 Negative  Negative Final   • KETONES, URINALYSIS 05/31/2023 Negative  Negative mg/dL Final   • SPECIFIC GRAVITY, URINALYSIS 05/31/2023 >1.030 (H)  1.005 - 1.030 Final   • OCCULT BLOOD, URINALYSIS 05/31/2023 Small (A)  Negative Final   • PH, URINALYSIS 05/31/2023 5.0  5.0 - 7.0 Final   • PROTEIN, URINALYSIS 05/31/2023 Negative  Negative mg/dL Final   • UROBILINOGEN, URINALYSIS 05/31/2023 0.2  0.2, 1.0 mg/dL Final   • NITRITE, URINALYSIS 05/31/2023 Negative  Negative Final   • LEUKOCYTE ESTERASE, URINALYSIS 05/31/2023 Negative  Negative Final   • SQUAMOUS EPITHELIAL, URINALYSIS 05/31/2023 1 to 5  None Seen, 1 to 5 /hpf Final   • ERYTHROCYTES, URINALYSIS 05/31/2023 3 to 5 (A)  None Seen, 1 to 2 /hpf Final   • LEUKOCYTES, URINALYSIS 05/31/2023 1 to 5  None Seen, 1 to 5 /hpf Final   • BACTERIA, URINALYSIS 05/31/2023 Few (A)   None Seen /hpf Final   • HYALINE CASTS, URINALYSIS 05/31/2023 1 to 5  None Seen, 1 to 5 /lpf Final   • MUCUS 05/31/2023 Present   Final   • WBC 05/31/2023 4.4  4.2 - 11.0 K/mcL Final   • RBC 05/31/2023 4.58  4.00 - 5.20 mil/mcL Final   • HGB 05/31/2023 13.2  12.0 - 15.5 g/dL Final   • HCT 05/31/2023 39.9  36.0 - 46.5 % Final   • MCV 05/31/2023 87.1  78.0 - 100.0 fl Final   • MCH 05/31/2023 28.8  26.0 - 34.0 pg Final   • MCHC 05/31/2023 33.1  32.0 - 36.5 g/dL Final   • RDW-CV 05/31/2023 12.3  11.0 - 15.0 % Final   • RDW-SD 05/31/2023 39.4  39.0 - 50.0 fL Final   • PLT 05/31/2023 287  140 - 450 K/mcL Final   • NRBC 05/31/2023 0  <=0 /100 WBC Final   • Neutrophil, Percent 05/31/2023 56  % Final   • Lymphocytes, Percent 05/31/2023 34  % Final   • Mono, Percent 05/31/2023 7  % Final   • Eosinophils, Percent 05/31/2023 2  % Final   • Basophils, Percent 05/31/2023 1  % Final   • Immature Granulocytes 05/31/2023 0  % Final   • Absolute Neutrophils 05/31/2023 2.5  1.8 - 8.0 K/mcL Final   • Absolute Lymphocytes 05/31/2023 1.5  1.2 - 5.2 K/mcL Final   • Absolute Monocytes 05/31/2023 0.3  0.3 - 0.9 K/mcL Final   • Absolute Eosinophils  05/31/2023 0.1  0.0 - 0.5 K/mcL Final   • Absolute Basophils 05/31/2023 0.0  0.0 - 0.3 K/mcL Final   • Absolute Immature Granulocytes 05/31/2023 0.0  0.0 - 0.2 K/mcL Final   Office Visit on 05/31/2023   Component Date Value Ref Range Status   • Chlamydia trachomatis by Nucleic A* 05/31/2023 Positive (A)  Negative Final   • Neisseria gonorrhoeae by Nucleic A* 05/31/2023 Negative  Negative Final   • Disclaimer 05/31/2023    Final                    Value:This result contains rich text formatting which cannot be displayed here.       ASSESSMENT AND PLAN:  This 19 year old female presents with :      1. Encounter to establish care    2. DANIEL (generalized anxiety disorder)    3. Mixed anxiety and depressive disorder    4. Dyslexia        Plan:    Encounter to establish care:  Comment: Past medical,  social, family, and surgical history reviewed and chart updated.   Reviewed and discussed previous labs.   Plan: Discussed different birth control options.    DANIEL (generalized anxiety disorder)/Mixed anxiety and depressive disorder/Dyslexia:  Comment: Ongoing symptoms.   Plan: Sertraline 50 mg tablet, Prescribed. Start half tablet orally once daily for 6 days, then take 1 tablet once daily. Anticipatory guidance on medication provided.   Service to behavioral health, referral provided.     Follow up in 6 weeks.       Orders Placed This Encounter   • SERVICE TO BEHAVIORAL HEALTH   • sertraline (ZOLOFT) 50 MG tablet       Refer to orders.  See the AVS for patient's instructions.  Medical compliance with plan discussed and risks of non-compliance reviewed.  Patient education completed on disease process, etiology & prognosis.  Proper usage and side effects of medications reviewed & discussed.  Patient understands and agrees with the plan.  Return to clinic as clinically indicated as discussed with patient who verbalized understanding of the plan and is in agreement with the plan.    I,  Dr. Brii Gant, have created a visit summary document based on the audio recording between Dr. Dinah Guillory MD and this patient for the physician to review, edit as needed, and authenticate.  Creation Date: 6/16/2023 Time: 11:20 PM     The documentation recorded by the scribe accurately and completely reflects the service(s) I personally performed and the decisions made by me.       Dinah Guillory M.D.  [facility]  6/16/2023     No

## 2023-07-11 ENCOUNTER — APPOINTMENT (OUTPATIENT)
Dept: GASTROENTEROLOGY | Facility: CLINIC | Age: 58
End: 2023-07-11

## 2023-07-12 NOTE — PATIENT PROFILE ADULT - HAS THE PATIENT RECEIVED THE INFLUENZA VACCINE THIS SEASON?
no... Dermal Autograft Text: The defect edges were debeveled with a #15 scalpel blade.  Given the location of the defect, shape of the defect and the proximity to free margins a dermal autograft was deemed most appropriate.  Using a sterile surgical marker, the primary defect shape was transferred to the donor site. The area thus outlined was incised deep to adipose tissue with a #15 scalpel blade.  The harvested graft was then trimmed of adipose and epidermal tissue until only dermis was left.  The skin graft was then placed in the primary defect and oriented appropriately.

## 2023-07-14 ENCOUNTER — APPOINTMENT (OUTPATIENT)
Dept: GASTROENTEROLOGY | Facility: CLINIC | Age: 58
End: 2023-07-14
Payer: MEDICAID

## 2023-07-14 VITALS
OXYGEN SATURATION: 98 % | SYSTOLIC BLOOD PRESSURE: 110 MMHG | WEIGHT: 132 LBS | RESPIRATION RATE: 16 BRPM | BODY MASS INDEX: 25.91 KG/M2 | HEIGHT: 60 IN | DIASTOLIC BLOOD PRESSURE: 70 MMHG | HEART RATE: 57 BPM | TEMPERATURE: 97.6 F

## 2023-07-14 DIAGNOSIS — Z80.0 FAMILY HISTORY OF MALIGNANT NEOPLASM OF DIGESTIVE ORGANS: ICD-10-CM

## 2023-07-14 PROCEDURE — 99204 OFFICE O/P NEW MOD 45 MIN: CPT

## 2023-07-14 NOTE — ASSESSMENT
[FreeTextEntry1] : 58F with a h/o sickle cell trait who presents for colonoscopy consultation. \par \par #Colorectal cancer screening\par - c'scope at [facility] with golytely prep \par - risks/benefits/alternatives and need for post-procedural escort discussed \par \par #FHX of stomach cancer in mother at young age \par - [place]\par - H pylori breath test \par \par Francisco Javier Mendoza MD\par Gastroenterology

## 2023-07-14 NOTE — PHYSICAL EXAM
[Alert] : alert [No Acute Distress] : no acute distress [Sclera] : the sclera and conjunctiva were normal [Hearing Threshold Finger Rub Not Burt] : hearing was normal [No Respiratory Distress] : no respiratory distress [No Acc Muscle Use] : no accessory muscle use [Respiration, Rhythm And Depth] : normal respiratory rhythm and effort [Heart Rate And Rhythm] : heart rate was normal and rhythm regular [Abdomen Tenderness] : non-tender [Abdomen Soft] : soft [] : no rash [No Focal Deficits] : no focal deficits [Oriented To Time, Place, And Person] : oriented to person, place, and time

## 2023-07-14 NOTE — HISTORY OF PRESENT ILLNESS
[FreeTextEntry1] : 58F with a h/o sickle cell trait who presents for colonoscopy consultation. Patient had last colonoscopy with Dr. Alexander in , and was told it was normal and to return in 10 years. Patient endorses intermittent constipation but it is always relieved with a veggie shake. Denies diarrhea, heartburn, abd pain, melena, hematochezia. \par \par Family Hx: mother  of stomach cancer at age 55\par \par Social Hx: pre- here in [place], smoked briefly in teenage years, no EtOH, no recreational drugs

## 2023-07-17 ENCOUNTER — NON-APPOINTMENT (OUTPATIENT)
Age: 58
End: 2023-07-17

## 2023-07-17 LAB — UREA BREATH TEST QL: NEGATIVE

## 2023-07-21 ENCOUNTER — APPOINTMENT (OUTPATIENT)
Dept: RADIOLOGY | Facility: HOSPITAL | Age: 58
End: 2023-07-21
Payer: MEDICAID

## 2023-07-21 ENCOUNTER — OUTPATIENT (OUTPATIENT)
Dept: OUTPATIENT SERVICES | Facility: HOSPITAL | Age: 58
LOS: 1 days | End: 2023-07-21
Payer: COMMERCIAL

## 2023-07-21 PROCEDURE — 77080 DXA BONE DENSITY AXIAL: CPT

## 2023-07-21 PROCEDURE — 77080 DXA BONE DENSITY AXIAL: CPT | Mod: 26

## 2023-08-29 ENCOUNTER — APPOINTMENT (OUTPATIENT)
Age: 58
End: 2023-08-29

## 2023-10-24 ENCOUNTER — APPOINTMENT (OUTPATIENT)
Dept: INTERNAL MEDICINE | Facility: CLINIC | Age: 58
End: 2023-10-24
Payer: MEDICAID

## 2023-10-24 VITALS
BODY MASS INDEX: 23.56 KG/M2 | SYSTOLIC BLOOD PRESSURE: 101 MMHG | DIASTOLIC BLOOD PRESSURE: 66 MMHG | HEIGHT: 60 IN | WEIGHT: 120 LBS | OXYGEN SATURATION: 96 % | TEMPERATURE: 98 F | HEART RATE: 56 BPM

## 2023-10-24 DIAGNOSIS — M81.0 AGE-RELATED OSTEOPOROSIS W/OUT CURRENT PATHOLOGICAL FRACTURE: ICD-10-CM

## 2023-10-24 DIAGNOSIS — Z00.01 ENCOUNTER FOR GENERAL ADULT MEDICAL EXAMINATION WITH ABNORMAL FINDINGS: ICD-10-CM

## 2023-10-24 DIAGNOSIS — M79.659 PAIN IN UNSPECIFIED THIGH: ICD-10-CM

## 2023-10-24 PROCEDURE — 99203 OFFICE O/P NEW LOW 30 MIN: CPT | Mod: 25

## 2023-10-24 PROCEDURE — 99386 PREV VISIT NEW AGE 40-64: CPT | Mod: 25

## 2023-10-30 ENCOUNTER — NON-APPOINTMENT (OUTPATIENT)
Age: 58
End: 2023-10-30

## 2023-11-29 ENCOUNTER — APPOINTMENT (OUTPATIENT)
Dept: INTERNAL MEDICINE | Facility: CLINIC | Age: 58
End: 2023-11-29
Payer: MEDICAID

## 2023-11-29 PROCEDURE — 99214 OFFICE O/P EST MOD 30 MIN: CPT | Mod: 95

## 2023-11-29 RX ORDER — GUAIFENESIN AND DEXTROMETHORPHAN HBR 20; 400 MG/1; MG/1
20-400 TABLET ORAL EVERY 4 HOURS
Qty: 20 | Refills: 0 | Status: DISCONTINUED | COMMUNITY
Start: 2019-11-25 | End: 2023-11-29

## 2023-11-29 RX ORDER — FLUTICASONE PROPIONATE 50 UG/1
50 SPRAY, METERED NASAL
Qty: 1 | Refills: 0 | Status: DISCONTINUED | COMMUNITY
Start: 2019-11-25 | End: 2023-11-29

## 2023-11-29 RX ORDER — ESTRADIOL 10 UG/1
10 TABLET, FILM COATED VAGINAL
Qty: 3 | Refills: 3 | Status: DISCONTINUED | COMMUNITY
Start: 2020-01-31 | End: 2023-11-29

## 2023-11-29 RX ORDER — POLYETHYLENE GLYCOL 3350 AND ELECTROLYTES WITH LEMON FLAVOR 236; 22.74; 6.74; 5.86; 2.97 G/4L; G/4L; G/4L; G/4L; G/4L
236 POWDER, FOR SOLUTION ORAL
Qty: 1 | Refills: 0 | Status: DISCONTINUED | COMMUNITY
Start: 2023-07-14 | End: 2023-11-29

## 2023-12-04 ENCOUNTER — APPOINTMENT (OUTPATIENT)
Dept: INTERNAL MEDICINE | Facility: CLINIC | Age: 58
End: 2023-12-04
Payer: MEDICAID

## 2023-12-04 DIAGNOSIS — Z00.00 ENCOUNTER FOR GENERAL ADULT MEDICAL EXAMINATION W/OUT ABNORMAL FINDINGS: ICD-10-CM

## 2023-12-04 PROCEDURE — 36415 COLL VENOUS BLD VENIPUNCTURE: CPT

## 2023-12-05 PROBLEM — Z00.00 ENCOUNTER FOR PREVENTIVE HEALTH EXAMINATION: Status: ACTIVE | Noted: 2017-03-03

## 2023-12-05 LAB
FOLATE SERPL-MCNC: 17.4 NG/ML
IRON SATN MFR SERPL: 14 %
IRON SERPL-MCNC: 48 UG/DL
MAGNESIUM SERPL-MCNC: 2.4 MG/DL
TIBC SERPL-MCNC: 339 UG/DL
UIBC SERPL-MCNC: 290 UG/DL

## 2023-12-08 ENCOUNTER — NON-APPOINTMENT (OUTPATIENT)
Age: 58
End: 2023-12-08

## 2023-12-10 LAB — PTH RELATED PROT SERPL-MCNC: <2 PMOL/L

## 2023-12-19 ENCOUNTER — APPOINTMENT (OUTPATIENT)
Dept: INTERNAL MEDICINE | Facility: CLINIC | Age: 58
End: 2023-12-19

## 2023-12-28 NOTE — ED PROVIDER NOTE - ST/T WAVE
[facility] called and stated they don't do joint ultrasounds. [facility] said if its for a hernia it needs to be soft tissue limited area. Pls call [facility] to discuss or put in new order.  Pt appt is tomorrow at [facility]. NSST changes

## 2024-02-14 ENCOUNTER — APPOINTMENT (OUTPATIENT)
Dept: INTERNAL MEDICINE | Facility: CLINIC | Age: 59
End: 2024-02-14
Payer: MEDICAID

## 2024-02-14 VITALS
HEART RATE: 66 BPM | HEIGHT: 60.5 IN | DIASTOLIC BLOOD PRESSURE: 71 MMHG | SYSTOLIC BLOOD PRESSURE: 117 MMHG | WEIGHT: 110 LBS | TEMPERATURE: 98 F | OXYGEN SATURATION: 99 % | BODY MASS INDEX: 21.04 KG/M2

## 2024-02-14 DIAGNOSIS — M81.0 AGE-RELATED OSTEOPOROSIS W/OUT CURRENT PATHOLOGICAL FRACTURE: ICD-10-CM

## 2024-02-14 DIAGNOSIS — F41.9 ANXIETY DISORDER, UNSPECIFIED: ICD-10-CM

## 2024-02-14 DIAGNOSIS — R63.4 ABNORMAL WEIGHT LOSS: ICD-10-CM

## 2024-02-14 PROCEDURE — 99214 OFFICE O/P EST MOD 30 MIN: CPT | Mod: 25

## 2024-02-14 NOTE — REVIEW OF SYSTEMS
[Palpitations] : palpitations [Anxiety] : anxiety [Fever] : no fever [Chills] : no chills [Vision Problems] : no vision problems [Hearing Loss] : no hearing loss [Nasal Discharge] : no nasal discharge [Chest Pain] : no chest pain [Leg Claudication] : no leg claudication [Lower Ext Edema] : no lower extremity edema [Orthopnea] : no orthopnea [Shortness Of Breath] : no shortness of breath [Wheezing] : no wheezing [Abdominal Pain] : no abdominal pain [Nausea] : no nausea [Constipation] : no constipation [Vomiting] : no vomiting [Dysuria] : no dysuria [Hematuria] : no hematuria [Joint Pain] : no joint pain [Joint Stiffness] : no joint stiffness [Headache] : no headache [Dizziness] : no dizziness [Memory Loss] : no memory loss

## 2024-02-14 NOTE — HISTORY OF PRESENT ILLNESS
[FreeTextEntry1] : pt is here due to having tingling feeling on left leg going down to foot and light headaches.    [de-identified] : 57 y/o F with pmhx of sickle cell trait, anxiety (no longer on meds), osteoporosis, who presented via telemedicine for a prescription of calcium and vitamin D. She presents today worried about her recent panic attacks that are accompanied with palpitations and chronic LLE tingling. These have been her symptoms of her anxiety since she first started having panic attacks 5 years ago. She had a few in the past 6 months and has 0.25 clonazepam that she takes 1/4 a tab of which aborts all the sensations. She sees a psychiatrist who prescribes these medications and feels okay just wants to see if there are any other medical causes going on like being post menopausal. ROS positive for 20 pound weight loss in 6 months.

## 2024-02-14 NOTE — PHYSICAL EXAM
[No Acute Distress] : no acute distress [Well Nourished] : well nourished [Well Developed] : well developed [Normal Sclera/Conjunctiva] : normal sclera/conjunctiva [Normal Outer Ear/Nose] : the outer ears and nose were normal in appearance [No Respiratory Distress] : no respiratory distress  [No Accessory Muscle Use] : no accessory muscle use [Clear to Auscultation] : lungs were clear to auscultation bilaterally [Normal Rate] : normal rate  [Regular Rhythm] : with a regular rhythm [Normal S1, S2] : normal S1 and S2 [No Edema] : there was no peripheral edema

## 2024-02-14 NOTE — END OF VISIT
[] : Resident [FreeTextEntry3] : #anxiety- been seeing psych. uses very very low dose of klonopin prn. gets panica ttackts. asked her to follow up psych to consider maintenance med too. consider talking to therapist  #osteoporosis - get pth. she declined med.   #weight loss = has been working on diet and exercise. check labs to evaluate more too-  [Time Spent: ___ minutes] : I have spent [unfilled] minutes of time on the encounter.

## 2024-02-14 NOTE — HEALTH RISK ASSESSMENT
[0] : 2) Feeling down, depressed, or hopeless: Not at all (0) [PHQ-2 Negative - No further assessment needed] : PHQ-2 Negative - No further assessment needed [[id number]] : 0

## 2024-02-15 LAB
ALBUMIN SERPL ELPH-MCNC: 5 G/DL
ALP BLD-CCNC: 107 U/L
ALT SERPL-CCNC: 8 U/L
ANION GAP SERPL CALC-SCNC: 13 MMOL/L
AST SERPL-CCNC: 15 U/L
BILIRUB SERPL-MCNC: 0.8 MG/DL
BUN SERPL-MCNC: 8 MG/DL
CALCIUM SERPL-MCNC: 10 MG/DL
CALCIUM SERPL-MCNC: 10 MG/DL
CHLORIDE SERPL-SCNC: 102 MMOL/L
CO2 SERPL-SCNC: 24 MMOL/L
CREAT SERPL-MCNC: 0.58 MG/DL
EGFR: 104 ML/MIN/1.73M2
GLUCOSE SERPL-MCNC: 89 MG/DL
HCT VFR BLD CALC: 36.3 %
HGB BLD-MCNC: 11.6 G/DL
MCHC RBC-ENTMCNC: 23.5 PG
MCHC RBC-ENTMCNC: 32 GM/DL
MCV RBC AUTO: 73.6 FL
PARATHYROID HORMONE INTACT: 22 PG/ML
PLATELET # BLD AUTO: 366 K/UL
POTASSIUM SERPL-SCNC: 4.1 MMOL/L
PROT SERPL-MCNC: 7.6 G/DL
RBC # BLD: 4.93 M/UL
RBC # FLD: 16.3 %
SODIUM SERPL-SCNC: 139 MMOL/L
TSH SERPL-ACNC: 1.82 UIU/ML
WBC # FLD AUTO: 7.11 K/UL

## 2024-05-15 NOTE — PATIENT PROFILE ADULT - NSPROMUTINFOINDIVIDFT_GEN_A_NUR
Quality 431: Preventive Care And Screening: Unhealthy Alcohol Use - Screening: Patient not identified as an unhealthy alcohol user when screened for unhealthy alcohol use using a systematic screening method
Detail Level: Detailed
Quality 226: Preventive Care And Screening: Tobacco Use: Screening And Cessation Intervention: Patient screened for tobacco use and is an ex/non-smoker
Quality 130: Documentation Of Current Medications In The Medical Record: Current Medications Documented
none

## 2024-06-10 ENCOUNTER — APPOINTMENT (OUTPATIENT)
Dept: GASTROENTEROLOGY | Facility: CLINIC | Age: 59
End: 2024-06-10
Payer: SELF-PAY

## 2024-06-10 VITALS
OXYGEN SATURATION: 96 % | WEIGHT: 120 LBS | SYSTOLIC BLOOD PRESSURE: 112 MMHG | HEIGHT: 60 IN | DIASTOLIC BLOOD PRESSURE: 68 MMHG | TEMPERATURE: 98.1 F | BODY MASS INDEX: 23.56 KG/M2 | HEART RATE: 74 BPM | RESPIRATION RATE: 17 BRPM

## 2024-06-10 DIAGNOSIS — Z12.11 ENCOUNTER FOR SCREENING FOR MALIGNANT NEOPLASM OF COLON: ICD-10-CM

## 2024-06-10 PROCEDURE — 99214 OFFICE O/P EST MOD 30 MIN: CPT

## 2024-06-10 NOTE — HISTORY OF PRESENT ILLNESS
[FreeTextEntry1] : 58F with a h/o sickle cell trait who presents for follow up of a colonoscopy consultation. She was initially scheduled for colonoscopy last year, but canceled as she was having anxiety issues with panic attacks. She now only has emergency medicaid, so she will have to pay out of pocket for a colonoscopy. She denies all GI symptoms at this time, including n/v, heartburn, abd pain, diarrhea, constipation, melena, hematochezia.   Prior HPI 23:  58F with a h/o sickle cell trait who presents for colonoscopy consultation. Patient had last colonoscopy with Dr. Alexander in , and was told it was normal and to return in 10 years. Patient endorses intermittent constipation but it is always relieved with a veggie shake. Denies diarrhea, heartburn, abd pain, melena, hematochezia.  Family Hx: mother  of stomach cancer at age 55  Social Hx: pre- here in [place], smoked briefly in teenage years, no EtOH, no recreational drugs

## 2024-06-10 NOTE — ASSESSMENT
[FreeTextEntry1] : 58F with a h/o sickle cell trait who presents for follow up of a colonoscopy consultation.   #CRC screening - advised waiting for colonoscopy until insurance issues are resolved, which she states should be within next 6 months  - if still unresolved at that time, advised cologuard to help us bridge the gap in time   Francisco Javier Mendoza MD Gastroenterology

## 2024-06-24 ENCOUNTER — OUTPATIENT (OUTPATIENT)
Dept: OUTPATIENT SERVICES | Facility: HOSPITAL | Age: 59
LOS: 1 days | End: 2024-06-24

## 2024-06-24 ENCOUNTER — APPOINTMENT (OUTPATIENT)
Dept: MAMMOGRAPHY | Facility: HOSPITAL | Age: 59
End: 2024-06-24
Payer: SELF-PAY

## 2024-06-24 PROCEDURE — 77067 SCR MAMMO BI INCL CAD: CPT

## 2024-06-24 PROCEDURE — 77067 SCR MAMMO BI INCL CAD: CPT | Mod: 26

## 2024-06-24 PROCEDURE — 77063 BREAST TOMOSYNTHESIS BI: CPT | Mod: 26

## 2024-06-24 PROCEDURE — 77063 BREAST TOMOSYNTHESIS BI: CPT

## 2024-07-07 NOTE — ED PROVIDER NOTE - CLINICAL SUMMARY MEDICAL DECISION MAKING FREE TEXT BOX
Yes Pt c/o cp, epigastric pain, n, palpitations, sob, anxiety/depression w/o si/hi.  EKG w/o stemi.  ? acs (no risks), ? referred gi from gerd/pud, no pe risks, no sig concern for pna based on exam/hpi, ? 2/2 anxiety.  Plan labs, cxr, gi meds, SW consult.  Reassess.

## 2024-09-24 RX ORDER — POLYETHYLENE GLYCOL 3350 AND ELECTROLYTES WITH LEMON FLAVOR 236; 22.74; 6.74; 5.86; 2.97 G/4L; G/4L; G/4L; G/4L; G/4L
236 POWDER, FOR SOLUTION ORAL
Qty: 4000 | Refills: 0 | Status: ACTIVE | COMMUNITY
Start: 2024-09-24 | End: 1900-01-01

## 2024-10-04 ENCOUNTER — APPOINTMENT (OUTPATIENT)
Dept: GASTROENTEROLOGY | Facility: HOSPITAL | Age: 59
End: 2024-10-04

## 2024-10-04 ENCOUNTER — OUTPATIENT (OUTPATIENT)
Dept: OUTPATIENT SERVICES | Facility: HOSPITAL | Age: 59
LOS: 1 days | Discharge: HOME | End: 2024-10-04
Payer: COMMERCIAL

## 2024-10-04 VITALS
OXYGEN SATURATION: 100 % | HEIGHT: 65 IN | HEART RATE: 59 BPM | DIASTOLIC BLOOD PRESSURE: 80 MMHG | SYSTOLIC BLOOD PRESSURE: 128 MMHG | TEMPERATURE: 97 F | RESPIRATION RATE: 19 BRPM

## 2024-10-04 PROCEDURE — G0121: CPT

## 2024-10-04 PROCEDURE — 45378 DIAGNOSTIC COLONOSCOPY: CPT

## 2024-10-04 NOTE — PRE-ANESTHESIA EVALUATION ADULT - NSANTHPMHFT_GEN_ALL_CORE
Additional PMHx: Sicle Cell Trait, Osteoporosis, Constipation    PSxHx: Rhinoplasty, Septoplasty, Colonoscopy

## 2025-01-14 ENCOUNTER — APPOINTMENT (OUTPATIENT)
Dept: INTERNAL MEDICINE | Facility: CLINIC | Age: 60
End: 2025-01-14

## 2025-01-14 VITALS
BODY MASS INDEX: 24.94 KG/M2 | OXYGEN SATURATION: 99 % | SYSTOLIC BLOOD PRESSURE: 125 MMHG | WEIGHT: 127 LBS | DIASTOLIC BLOOD PRESSURE: 67 MMHG | HEART RATE: 66 BPM | HEIGHT: 60 IN | TEMPERATURE: 97.2 F

## 2025-01-14 DIAGNOSIS — E78.5 HYPERLIPIDEMIA, UNSPECIFIED: ICD-10-CM

## 2025-01-14 DIAGNOSIS — M81.0 AGE-RELATED OSTEOPOROSIS W/OUT CURRENT PATHOLOGICAL FRACTURE: ICD-10-CM

## 2025-01-14 DIAGNOSIS — Z23 ENCOUNTER FOR IMMUNIZATION: ICD-10-CM

## 2025-01-14 DIAGNOSIS — Z00.00 ENCOUNTER FOR GENERAL ADULT MEDICAL EXAMINATION W/OUT ABNORMAL FINDINGS: ICD-10-CM

## 2025-01-14 PROCEDURE — G2211 COMPLEX E/M VISIT ADD ON: CPT | Mod: NC

## 2025-01-14 PROCEDURE — 99213 OFFICE O/P EST LOW 20 MIN: CPT

## 2025-01-14 RX ORDER — ALENDRONATE SODIUM 10 MG/1
10 TABLET ORAL
Refills: 0 | Status: ACTIVE | COMMUNITY
Start: 2025-01-14

## 2025-01-17 ENCOUNTER — NON-APPOINTMENT (OUTPATIENT)
Age: 60
End: 2025-01-17

## 2025-01-21 ENCOUNTER — TRANSCRIPTION ENCOUNTER (OUTPATIENT)
Age: 60
End: 2025-01-21

## 2025-03-24 ENCOUNTER — APPOINTMENT (OUTPATIENT)
Dept: INTERNAL MEDICINE | Facility: CLINIC | Age: 60
End: 2025-03-24

## 2025-04-10 ENCOUNTER — APPOINTMENT (OUTPATIENT)
Dept: INTERNAL MEDICINE | Facility: CLINIC | Age: 60
End: 2025-04-10

## 2025-04-10 VITALS
DIASTOLIC BLOOD PRESSURE: 73 MMHG | HEART RATE: 73 BPM | WEIGHT: 130 LBS | HEIGHT: 60 IN | SYSTOLIC BLOOD PRESSURE: 122 MMHG | TEMPERATURE: 97.7 F | OXYGEN SATURATION: 98 % | BODY MASS INDEX: 25.52 KG/M2

## 2025-04-10 DIAGNOSIS — N64.4 MASTODYNIA: ICD-10-CM

## 2025-04-10 DIAGNOSIS — M81.0 AGE-RELATED OSTEOPOROSIS W/OUT CURRENT PATHOLOGICAL FRACTURE: ICD-10-CM

## 2025-04-10 DIAGNOSIS — F41.9 ANXIETY DISORDER, UNSPECIFIED: ICD-10-CM

## 2025-04-10 DIAGNOSIS — R71.8 OTHER ABNORMALITY OF RED BLOOD CELLS: ICD-10-CM

## 2025-04-10 DIAGNOSIS — Z13.1 ENCOUNTER FOR SCREENING FOR DIABETES MELLITUS: ICD-10-CM

## 2025-04-10 DIAGNOSIS — Z00.00 ENCOUNTER FOR GENERAL ADULT MEDICAL EXAMINATION W/OUT ABNORMAL FINDINGS: ICD-10-CM

## 2025-04-10 PROCEDURE — 99396 PREV VISIT EST AGE 40-64: CPT | Mod: 25

## 2025-04-10 PROCEDURE — 99213 OFFICE O/P EST LOW 20 MIN: CPT | Mod: 25,GC

## 2025-04-11 ENCOUNTER — TRANSCRIPTION ENCOUNTER (OUTPATIENT)
Age: 60
End: 2025-04-11

## 2025-04-11 DIAGNOSIS — E61.1 IRON DEFICIENCY: ICD-10-CM

## 2025-04-11 LAB
ALBUMIN SERPL ELPH-MCNC: 4.7 G/DL
ALP BLD-CCNC: 104 U/L
ALT SERPL-CCNC: 13 U/L
ANION GAP SERPL CALC-SCNC: 12 MMOL/L
AST SERPL-CCNC: 20 U/L
BASOPHILS # BLD AUTO: 0.03 K/UL
BASOPHILS NFR BLD AUTO: 0.5 %
BILIRUB SERPL-MCNC: 1 MG/DL
BUN SERPL-MCNC: 12 MG/DL
CALCIUM SERPL-MCNC: 9.4 MG/DL
CHLORIDE SERPL-SCNC: 105 MMOL/L
CO2 SERPL-SCNC: 24 MMOL/L
CREAT SERPL-MCNC: 0.59 MG/DL
EGFRCR SERPLBLD CKD-EPI 2021: 103 ML/MIN/1.73M2
EOSINOPHIL # BLD AUTO: 0.07 K/UL
EOSINOPHIL NFR BLD AUTO: 1.1 %
ESTIMATED AVERAGE GLUCOSE: 105 MG/DL
FERRITIN SERPL-MCNC: 51 NG/ML
GLUCOSE SERPL-MCNC: 113 MG/DL
HBA1C MFR BLD HPLC: 5.3 %
HCT VFR BLD CALC: 36.6 %
HGB BLD-MCNC: 12.2 G/DL
IMM GRANULOCYTES NFR BLD AUTO: 0.3 %
IRON SATN MFR SERPL: 26 %
IRON SERPL-MCNC: 93 UG/DL
LYMPHOCYTES # BLD AUTO: 1.48 K/UL
LYMPHOCYTES NFR BLD AUTO: 23.8 %
MAN DIFF?: NORMAL
MCHC RBC-ENTMCNC: 24 PG
MCHC RBC-ENTMCNC: 33.3 G/DL
MCV RBC AUTO: 72 FL
MONOCYTES # BLD AUTO: 0.27 K/UL
MONOCYTES NFR BLD AUTO: 4.3 %
NEUTROPHILS # BLD AUTO: 4.36 K/UL
NEUTROPHILS NFR BLD AUTO: 70 %
PLATELET # BLD AUTO: 349 K/UL
POTASSIUM SERPL-SCNC: 4.2 MMOL/L
PROT SERPL-MCNC: 7.3 G/DL
RBC # BLD: 5.08 M/UL
RBC # FLD: 16.3 %
SODIUM SERPL-SCNC: 140 MMOL/L
TIBC SERPL-MCNC: 350 UG/DL
UIBC SERPL-MCNC: 258 UG/DL
WBC # FLD AUTO: 6.23 K/UL

## 2025-04-11 RX ORDER — CHLORHEXIDINE GLUCONATE 4 %
325 (65 FE) LIQUID (ML) TOPICAL
Qty: 45 | Refills: 3 | Status: ACTIVE | COMMUNITY
Start: 2025-04-11

## 2025-04-16 ENCOUNTER — RESULT REVIEW (OUTPATIENT)
Age: 60
End: 2025-04-16

## 2025-04-16 ENCOUNTER — NON-APPOINTMENT (OUTPATIENT)
Age: 60
End: 2025-04-16

## 2025-04-16 ENCOUNTER — APPOINTMENT (OUTPATIENT)
Dept: ULTRASOUND IMAGING | Facility: CLINIC | Age: 60
End: 2025-04-16
Payer: COMMERCIAL

## 2025-04-16 ENCOUNTER — APPOINTMENT (OUTPATIENT)
Dept: MAMMOGRAPHY | Facility: CLINIC | Age: 60
End: 2025-04-16
Payer: COMMERCIAL

## 2025-04-16 PROCEDURE — 76641 ULTRASOUND BREAST COMPLETE: CPT | Mod: RT

## 2025-04-16 PROCEDURE — G0279: CPT | Mod: RT

## 2025-04-16 PROCEDURE — 77065 DX MAMMO INCL CAD UNI: CPT | Mod: RT

## 2025-05-25 NOTE — PACU DISCHARGE NOTE - NS MD DISCHARGE NOTE DISCHARGE
/67 (BP Location: Right arm)   Pulse 84   Temp 98.2  F (36.8  C) (Oral)   Resp 16   Wt 64.8 kg (142 lb 13.7 oz)   SpO2 97%   BMI 22.77 kg/m       Problem: Adult Inpatient Plan of Care  Goal: Plan of Care Review  Description: The Plan of Care Review/Shift note should be completed every shift.  The Outcome Evaluation is a brief statement about your assessment that the patient is improving, declining, or no change.  This information will be displayed automatically on your shift  note.  Outcome: Progressing     Goal Outcome Evaluation: Pt. A&Ox4. VSS on RA. Up ad bernardo. Dyspnea upon exertion. ACHS BG. Pt has implanted ambulatory pump. Anxiety/ trauma with blood draws and infusions, pre meds, games and therapeutic techniques utilized. Mom at bedside- attentive to pt.   L PIV SL.            Home

## 2025-06-19 NOTE — PATIENT PROFILE ADULT - NSPROMEDSHERBAL_GEN_A_NUR
June 2020:  Pt seen for follow up last on 5/6/20 for diarrhea. Suspected to have IBS-D vs dumping sydn. Celiac panel neg. Infectious stool studies neg. Fecal fat screen neg. Diarrhea has nearly resolved with rifaximin. No new symptoms.  Of note, cholecystectomy. Partial gastrectomy 1980s. Colonoscopy 9/14 normal   June 2018:    Upper abdominal pain. Loose stools. daily. more than 5 times per day. Gb removed 2011. No recent EGD.    JUNE 2018 - Last seen in Nov 2014 for chronic diarrhea, felt to be from IBS as well as dumping syndrome due to partial gastrectomy ( 1980s). Colonoscopy Sept 2014: normal, repeat advised in 10 years. Path: did not reveal any inflammation in TI or colon. Stool tests 2014: normal.  SUMMARY OF PREVIOUS VISIT NOV 2014 -  Colonoscopy Sep 2014- normal, repeat recommended in 10 years   Path- normal TI and colon biopsy.
no

## 2025-07-14 ENCOUNTER — APPOINTMENT (OUTPATIENT)
Dept: MAMMOGRAPHY | Facility: CLINIC | Age: 60
End: 2025-07-14
Payer: COMMERCIAL

## 2025-07-14 ENCOUNTER — APPOINTMENT (OUTPATIENT)
Dept: ULTRASOUND IMAGING | Facility: CLINIC | Age: 60
End: 2025-07-14

## 2025-07-14 PROCEDURE — 77063 BREAST TOMOSYNTHESIS BI: CPT

## 2025-07-14 PROCEDURE — 77067 SCR MAMMO BI INCL CAD: CPT

## 2025-07-14 PROCEDURE — 76641 ULTRASOUND BREAST COMPLETE: CPT | Mod: 50
